# Patient Record
Sex: FEMALE | Race: WHITE | NOT HISPANIC OR LATINO
[De-identification: names, ages, dates, MRNs, and addresses within clinical notes are randomized per-mention and may not be internally consistent; named-entity substitution may affect disease eponyms.]

---

## 2017-02-11 ENCOUNTER — TRANSCRIPTION ENCOUNTER (OUTPATIENT)
Age: 42
End: 2017-02-11

## 2017-02-17 ENCOUNTER — TRANSCRIPTION ENCOUNTER (OUTPATIENT)
Age: 42
End: 2017-02-17

## 2017-02-27 ENCOUNTER — TRANSCRIPTION ENCOUNTER (OUTPATIENT)
Age: 42
End: 2017-02-27

## 2017-05-31 ENCOUNTER — TRANSCRIPTION ENCOUNTER (OUTPATIENT)
Age: 42
End: 2017-05-31

## 2018-02-14 ENCOUNTER — TRANSCRIPTION ENCOUNTER (OUTPATIENT)
Age: 43
End: 2018-02-14

## 2018-07-16 ENCOUNTER — TRANSCRIPTION ENCOUNTER (OUTPATIENT)
Age: 43
End: 2018-07-16

## 2023-09-20 ENCOUNTER — INPATIENT (INPATIENT)
Facility: HOSPITAL | Age: 48
LOS: 1 days | Discharge: HOME | DRG: 261 | End: 2023-09-22
Attending: GENERAL ACUTE CARE HOSPITAL | Admitting: STUDENT IN AN ORGANIZED HEALTH CARE EDUCATION/TRAINING PROGRAM
Payer: COMMERCIAL

## 2023-09-20 VITALS
RESPIRATION RATE: 18 BRPM | DIASTOLIC BLOOD PRESSURE: 85 MMHG | HEART RATE: 117 BPM | OXYGEN SATURATION: 96 % | TEMPERATURE: 98 F | SYSTOLIC BLOOD PRESSURE: 125 MMHG

## 2023-09-20 DIAGNOSIS — R94.31 ABNORMAL ELECTROCARDIOGRAM [ECG] [EKG]: ICD-10-CM

## 2023-09-20 DIAGNOSIS — R55 SYNCOPE AND COLLAPSE: ICD-10-CM

## 2023-09-20 DIAGNOSIS — I42.2 OTHER HYPERTROPHIC CARDIOMYOPATHY: ICD-10-CM

## 2023-09-20 LAB
ALBUMIN SERPL ELPH-MCNC: 4.5 G/DL — SIGNIFICANT CHANGE UP (ref 3.3–5.2)
ALP SERPL-CCNC: 59 U/L — SIGNIFICANT CHANGE UP (ref 40–120)
ALT FLD-CCNC: 19 U/L — SIGNIFICANT CHANGE UP
ANION GAP SERPL CALC-SCNC: 16 MMOL/L — SIGNIFICANT CHANGE UP (ref 5–17)
AST SERPL-CCNC: 18 U/L — SIGNIFICANT CHANGE UP
BASOPHILS # BLD AUTO: 0.05 K/UL — SIGNIFICANT CHANGE UP (ref 0–0.2)
BASOPHILS NFR BLD AUTO: 0.3 % — SIGNIFICANT CHANGE UP (ref 0–2)
BILIRUB SERPL-MCNC: 0.9 MG/DL — SIGNIFICANT CHANGE UP (ref 0.4–2)
BUN SERPL-MCNC: 8.6 MG/DL — SIGNIFICANT CHANGE UP (ref 8–20)
CALCIUM SERPL-MCNC: 10.2 MG/DL — SIGNIFICANT CHANGE UP (ref 8.4–10.5)
CHLORIDE SERPL-SCNC: 95 MMOL/L — LOW (ref 96–108)
CO2 SERPL-SCNC: 21 MMOL/L — LOW (ref 22–29)
CREAT SERPL-MCNC: 0.85 MG/DL — SIGNIFICANT CHANGE UP (ref 0.5–1.3)
EGFR: 83 ML/MIN/1.73M2 — SIGNIFICANT CHANGE UP
EOSINOPHIL # BLD AUTO: 0.08 K/UL — SIGNIFICANT CHANGE UP (ref 0–0.5)
EOSINOPHIL NFR BLD AUTO: 0.5 % — SIGNIFICANT CHANGE UP (ref 0–6)
ETHANOL SERPL-MCNC: <10 MG/DL — SIGNIFICANT CHANGE UP (ref 0–9)
GLUCOSE SERPL-MCNC: 143 MG/DL — HIGH (ref 70–99)
HCG SERPL-ACNC: <4 MIU/ML — SIGNIFICANT CHANGE UP
HCT VFR BLD CALC: 50.9 % — HIGH (ref 34.5–45)
HGB BLD-MCNC: 17.9 G/DL — HIGH (ref 11.5–15.5)
IMM GRANULOCYTES NFR BLD AUTO: 0.5 % — SIGNIFICANT CHANGE UP (ref 0–0.9)
LYMPHOCYTES # BLD AUTO: 1.96 K/UL — SIGNIFICANT CHANGE UP (ref 1–3.3)
LYMPHOCYTES # BLD AUTO: 13.3 % — SIGNIFICANT CHANGE UP (ref 13–44)
MCHC RBC-ENTMCNC: 32.1 PG — SIGNIFICANT CHANGE UP (ref 27–34)
MCHC RBC-ENTMCNC: 35.2 GM/DL — SIGNIFICANT CHANGE UP (ref 32–36)
MCV RBC AUTO: 91.2 FL — SIGNIFICANT CHANGE UP (ref 80–100)
MONOCYTES # BLD AUTO: 0.94 K/UL — HIGH (ref 0–0.9)
MONOCYTES NFR BLD AUTO: 6.4 % — SIGNIFICANT CHANGE UP (ref 2–14)
NEUTROPHILS # BLD AUTO: 11.61 K/UL — HIGH (ref 1.8–7.4)
NEUTROPHILS NFR BLD AUTO: 79 % — HIGH (ref 43–77)
PLATELET # BLD AUTO: 268 K/UL — SIGNIFICANT CHANGE UP (ref 150–400)
POTASSIUM SERPL-MCNC: 3.9 MMOL/L — SIGNIFICANT CHANGE UP (ref 3.5–5.3)
POTASSIUM SERPL-SCNC: 3.9 MMOL/L — SIGNIFICANT CHANGE UP (ref 3.5–5.3)
PROT SERPL-MCNC: 7.3 G/DL — SIGNIFICANT CHANGE UP (ref 6.6–8.7)
RBC # BLD: 5.58 M/UL — HIGH (ref 3.8–5.2)
RBC # FLD: 12.6 % — SIGNIFICANT CHANGE UP (ref 10.3–14.5)
SODIUM SERPL-SCNC: 132 MMOL/L — LOW (ref 135–145)
TROPONIN T SERPL-MCNC: <0.01 NG/ML — SIGNIFICANT CHANGE UP (ref 0–0.06)
TROPONIN T SERPL-MCNC: <0.01 NG/ML — SIGNIFICANT CHANGE UP (ref 0–0.06)
WBC # BLD: 14.72 K/UL — HIGH (ref 3.8–10.5)
WBC # FLD AUTO: 14.72 K/UL — HIGH (ref 3.8–10.5)

## 2023-09-20 PROCEDURE — 99285 EMERGENCY DEPT VISIT HI MDM: CPT | Mod: 25

## 2023-09-20 PROCEDURE — 72125 CT NECK SPINE W/O DYE: CPT | Mod: 26,MA

## 2023-09-20 PROCEDURE — 12001 RPR S/N/AX/GEN/TRNK 2.5CM/<: CPT | Mod: 59

## 2023-09-20 PROCEDURE — 12011 RPR F/E/E/N/L/M 2.5 CM/<: CPT

## 2023-09-20 PROCEDURE — 71045 X-RAY EXAM CHEST 1 VIEW: CPT | Mod: 26

## 2023-09-20 PROCEDURE — 70486 CT MAXILLOFACIAL W/O DYE: CPT | Mod: 26,MA

## 2023-09-20 PROCEDURE — 99223 1ST HOSP IP/OBS HIGH 75: CPT

## 2023-09-20 PROCEDURE — 93010 ELECTROCARDIOGRAM REPORT: CPT | Mod: 76

## 2023-09-20 PROCEDURE — 70450 CT HEAD/BRAIN W/O DYE: CPT | Mod: 26,MA

## 2023-09-20 PROCEDURE — 74018 RADEX ABDOMEN 1 VIEW: CPT | Mod: 26

## 2023-09-20 PROCEDURE — 93306 TTE W/DOPPLER COMPLETE: CPT | Mod: 26

## 2023-09-20 PROCEDURE — 99222 1ST HOSP IP/OBS MODERATE 55: CPT

## 2023-09-20 RX ORDER — ACETAMINOPHEN 500 MG
1000 TABLET ORAL ONCE
Refills: 0 | Status: COMPLETED | OUTPATIENT
Start: 2023-09-20 | End: 2023-09-20

## 2023-09-20 RX ORDER — INFLUENZA VIRUS VACCINE 15; 15; 15; 15 UG/.5ML; UG/.5ML; UG/.5ML; UG/.5ML
0.5 SUSPENSION INTRAMUSCULAR ONCE
Refills: 0 | Status: DISCONTINUED | OUTPATIENT
Start: 2023-09-20 | End: 2023-09-22

## 2023-09-20 RX ORDER — TETANUS TOXOID, REDUCED DIPHTHERIA TOXOID AND ACELLULAR PERTUSSIS VACCINE, ADSORBED 5; 2.5; 8; 8; 2.5 [IU]/.5ML; [IU]/.5ML; UG/.5ML; UG/.5ML; UG/.5ML
0.5 SUSPENSION INTRAMUSCULAR ONCE
Refills: 0 | Status: COMPLETED | OUTPATIENT
Start: 2023-09-20 | End: 2023-09-20

## 2023-09-20 RX ORDER — METOPROLOL TARTRATE 50 MG
50 TABLET ORAL EVERY 12 HOURS
Refills: 0 | Status: COMPLETED | OUTPATIENT
Start: 2023-09-20 | End: 2023-09-21

## 2023-09-20 RX ORDER — ALPRAZOLAM 0.25 MG
0.5 TABLET ORAL ONCE
Refills: 0 | Status: DISCONTINUED | OUTPATIENT
Start: 2023-09-20 | End: 2023-09-20

## 2023-09-20 RX ORDER — ACETAMINOPHEN 500 MG
650 TABLET ORAL EVERY 6 HOURS
Refills: 0 | Status: DISCONTINUED | OUTPATIENT
Start: 2023-09-20 | End: 2023-09-22

## 2023-09-20 RX ORDER — ACETAMINOPHEN 500 MG
1000 TABLET ORAL ONCE
Refills: 0 | Status: COMPLETED | OUTPATIENT
Start: 2023-09-20 | End: 2023-09-21

## 2023-09-20 RX ADMIN — Medication 0.5 MILLIGRAM(S): at 06:12

## 2023-09-20 RX ADMIN — Medication 650 MILLIGRAM(S): at 19:00

## 2023-09-20 RX ADMIN — Medication 0.5 MILLIGRAM(S): at 15:41

## 2023-09-20 RX ADMIN — TETANUS TOXOID, REDUCED DIPHTHERIA TOXOID AND ACELLULAR PERTUSSIS VACCINE, ADSORBED 0.5 MILLILITER(S): 5; 2.5; 8; 8; 2.5 SUSPENSION INTRAMUSCULAR at 02:36

## 2023-09-20 RX ADMIN — Medication 50 MILLIGRAM(S): at 19:00

## 2023-09-20 RX ADMIN — Medication 0.5 MILLIGRAM(S): at 02:34

## 2023-09-20 RX ADMIN — Medication 400 MILLIGRAM(S): at 10:36

## 2023-09-20 NOTE — CONSULT NOTE ADULT - NS_MD_PANP_GEN_ALL_CORE
Attending and PA/NP shared services statement (NON-critical care):
Attending and PA/NP shared services statement (NON-critical care):
no

## 2023-09-20 NOTE — H&P ADULT - NSHPLABSRESULTS_GEN_ALL_CORE
.  LABS:                         17.9   14.72 )-----------( 268      ( 20 Sep 2023 02:27 )             50.9     09-20    132<L>  |  95<L>  |  8.6  ----------------------------<  143<H>  3.9   |  21.0<L>  |  0.85    Ca    10.2      20 Sep 2023 02:27    TPro  7.3  /  Alb  4.5  /  TBili  0.9  /  DBili  x   /  AST  18  /  ALT  19  /  AlkPhos  59  09-20      Urinalysis Basic - ( 20 Sep 2023 02:27 )    Color: x / Appearance: x / SG: x / pH: x  Gluc: 143 mg/dL / Ketone: x  / Bili: x / Urobili: x   Blood: x / Protein: x / Nitrite: x   Leuk Esterase: x / RBC: x / WBC x   Sq Epi: x / Non Sq Epi: x / Bacteria: x            RADIOLOGY, EKG & ADDITIONAL TESTS: Reviewed.

## 2023-09-20 NOTE — PATIENT PROFILE ADULT - DEAF OR HARD OF HEARING?
Problem: Safety - Adult  Goal: Free from fall injury  7/21/2023 1910 by Toya Baig RN  Outcome: Progressing  Note: Pt in bed with bed alarm on, instructed to call for assistance. Verbalized understanding. All fall precautions in place. Problem: ABCDS Injury Assessment  Goal: Absence of physical injury  7/21/2023 1910 by Toya Baig RN  Outcome: Progressing  Note: Patient walked the halls multiple times today. Patient also did the bike in the family room for about 10 mins. Patient tolerated well. Patient declined nausea or pain during shift. Patient ate mor alysa 75 percent for all meals. Patient did appeared agitated in the morning but it had to do with the breakfast order being wrong. Over the course of shift patient pleasant. Spoke with Glory Aguirre NP in regards to patient's chemo medication that the pharmacy didn't ship. Patient's wife given update that it will be delivered Monday. no

## 2023-09-20 NOTE — ED CDU PROVIDER DISPOSITION NOTE - CLINICAL COURSE
49 yo female no reported past medical recent sinus infection on augmentin reported dizziness with questionable syncopal episode, ct without acute bleed or fracture, multiple lacerations repaired by initial ED team, found to have t wave inversions lateral lead with cards consult placed with recs for TTE, found to have apical hypertrophy on TTE with recs for admit for cardiac MRI

## 2023-09-20 NOTE — ED PROVIDER NOTE - CARE PLAN
1 Principal Discharge DX:	Abnormal EKG  Secondary Diagnosis:	Dizziness  Secondary Diagnosis:	Fall  Secondary Diagnosis:	Laceration of face  Secondary Diagnosis:	Laceration of knee, left  Secondary Diagnosis:	Tooth missing

## 2023-09-20 NOTE — ED PROVIDER NOTE - OBJECTIVE STATEMENT
49 yo female no PMHx presents to ED c/o fall. Patient has been dizzy x1 week, diagnosed with sinus infection. Presently on Augmentin. Tonight got up from couch, became dizzy, hit door handle with face. Sustained laceration to lip and knee as well as missing teeth. Unwitnessed. Tetanus unknown. No further complaints at this time.   Denies LOC, chest pain, SOB. 49 yo female no PMHx presents to ED c/o fall. Patient has been dizzy x1 week, diagnosed with sinus infection. States normally becomes dizzy with sinus infections. Presently on Augmentin; has another four days left. Tonight got up from couch, became dizzy, hit door handle with face. Sustained laceration to lip and knee as well as missing teeth. Unwitnessed. Tetanus unknown. No further complaints at this time.   Denies LOC, chest pain, SOB. 47 yo female no PMHx presents to ED c/o fall. Patient has been dizzy x1 week, diagnosed with sinus infection. States normally becomes dizzy with sinus infections. Presently on Augmentin; has another four days left. Tonight got up from couch, became dizzy, hit door handle with face. No preceding sxms. Sustained laceration to lip and knee as well as missing teeth. Unwitnessed. Tetanus unknown. No further complaints at this time.   Denies LOC, chest pain, SOB.

## 2023-09-20 NOTE — ED ADULT NURSE NOTE - CHIEF COMPLAINT QUOTE
patient status post fall,reports feeling dizzy precipitating her fall,patient Alert and oriented to person, place, and time, denied loss of consciousness, bleeding to lip, patient anxious and upset on arrival, front left tooth missing, bleeding controlled, denied alcohol use

## 2023-09-20 NOTE — H&P ADULT - NSICDXFAMILYHX_GEN_ALL_CORE_FT
FAMILY HISTORY:  No pertinent family history in first degree relatives     FAMILY HISTORY:  Grandparent  Still living? Unknown  FH: lung cancer, Age at diagnosis: Age Unknown

## 2023-09-20 NOTE — ED PROVIDER NOTE - NSFOLLOWUPINSTRUCTIONS_ED_ALL_ED_FT
- Prescription sent to pharmacy.    - Additional four days of Augmentin sent.   - Ibuprofen 600mg every 6 hours as needed for pain.  - Acetaminophen 650mg every 6 hours as needed for pain.   - Apply Bacitracin as needed.  - Keep out of sun.  - No not submerge in water (i.e. ocean, pool).   - Please call today to schedule follow up appointment with dental clinic.   - Please follow up with your primary care physician in 24-48 hours.   - Please seek immediate medical attention for any new/worsening, signs/symptoms or concerns including but not limited to severe pain/swelling/surrounding redness, or drainage from wound.    Suture Removal Instructions  - Lip: dissolvable sutures  - Chin: 5-6 days  - Knee: 10-14 days      Feel better.      Sutured Wound Care      Sutures are stitches that can be used to close wounds. Taking care of your wound properly can help to prevent pain and infection. It can also help your wound to heal more quickly. Follow instructions from your health care provider about how to care for your sutured wound.      Supplies needed:    •Soap and water.      •A clean bandage (dressing), if needed.      •Antibiotic ointment.      •A clean towel.        How to care for your sutured wound     •Keep the wound completely dry for the first 24 hours, or for as long as directed by your health care provider. After 24–48 hours, you may shower or bathe as directed by your health care provider. Do not soak or submerge the wound in water until the sutures have been removed.    •After the first 24 hours, clean the wound once a day, or as often as directed by your health care provider, using the following steps:  •Wash the wound with soap and water.      •Rinse the wound with water to remove all soap.      •Pat the wound dry with a clean towel. Do not rub the wound.        •After cleaning the wound, apply a thin layer of antibiotic ointment as directed by your health care provider. This will prevent infection and keep the dressing from sticking to the wound.    •Follow instructions from your health care provider about how to change your dressing:  •Wash your hands with soap and water. If soap and water are not available, use hand .      •Change your dressing at least once a day, or as often as told by your health care provider. If your dressing gets wet or dirty, change it.      •Leave sutures and other skin closures, such as adhesive tape or skin glue, in place. These skin closures may need to stay in place for 2 weeks or longer. If adhesive strip edges start to loosen and curl up, you may trim the loose edges. Do not remove adhesive strips completely unless your health care provider tells you to do that.      •Check your wound every day for signs of infection. Watch for:  •Redness, swelling, or pain.      •Fluid or blood.      •Warmth.      •Pus or a bad smell.        •Have the sutures removed as directed by your health care provider.        Follow these instructions at home:    Medicines     •Take or apply over-the-counter and prescription medicines only as told by your health care provider.      •If you were prescribed an antibiotic medicine or ointment, take or apply it as told by your health care provider. Do not stop using the antibiotic even if your condition improves.      General instructions     •To help reduce scarring after your wound heals, cover your wound with clothing or apply sunscreen of at least 30 SPF whenever you are outside.      • Do not scratch or pick at your wound.      •Avoid stretching your wound.      •Raise (elevate) the injured area above the level of your heart while you are sitting or lying down, if possible.      •Drink enough fluids to keep your urine clear or pale yellow.      •Keep all follow-up visits as told by your health care provider. This is important.        Contact a health care provider if:    •You received a tetanus shot and you have swelling, severe pain, redness, or bleeding at the injection site.      •Your wound breaks open.      •You have redness, swelling, or pain around your wound.      •You have fluid or blood coming from your wound.      •Your wound feels warm to the touch.      •You have a fever.      •You notice something coming out of your wound, such as wood or glass.      •You have pain that does not get better with medicine.      •The skin near your wound changes color.      •You need to change your dressing very frequently due to a lot of fluid, blood, or pus draining from the wound.      •You develop a new rash.      •You develop numbness around the wound.        Get help right away if:    •You develop severe swelling around your wound.      •You have pus or a bad smell coming from your wound.      •Your pain suddenly gets worse and is severe.      •You develop painful lumps near your wound or anywhere on your body.      •You have a red streak going away from your wound.    •The wound is on your hand or foot and:  •You cannot properly move a finger or toe.      •Your fingers or toes look pale or bluish.      •You have numbness that is spreading down your hand, foot, fingers, or toes.          Summary    •Sutures are stitches that can be used to close wounds.      •Taking care of your wound properly can help to prevent pain and infection.      •Keep the wound completely dry for the first 24 hours, or for as long as directed by your health care provider. After 24–48 hours, you may shower or bathe as directed by your health care provider.      This information is not intended to replace advice given to you by your health care provider. Make sure you discuss any questions you have with your health care provider.

## 2023-09-20 NOTE — H&P ADULT - NSHPPHYSICALEXAM_GEN_ALL_CORE
CONSTITUTIONAL: Awake, alert and in no apparent distress  ENMT: Airway patent, Nasal mucosa clear. Mouth with normal mucosa.   EYES: Clear bilaterally, pupils equal, round and reactive to light. EOMI.  CARDIAC: Normal rate, regular rhythm.  Heart sounds S1, S2.  No murmurs, rubs or gallops   RESPIRATORY: Breath sounds clear and equal bilaterally. No wheezes, rhales or rhonchi   ABDOMINAL: Nontender to palpation. No rebound/ guarding   MUSCULOSKELETAL: Spine appears normal, range of motion is not limited, no muscle or joint tenderness   EXTREMITIES: No edema, cyanosis or deformity   NEUROLOGICAL: Alert and oriented, no focal deficits, no motor or sensory deficits   SKIN: No rash, skin turgor CONSTITUTIONAL: Awake, alert and in no apparent distress  ENMT: Lip laceration with dry blood, several teeth missing   CARDIAC: Normal rate, regular rhythm.  Heart sounds S1, S2.    RESPIRATORY: Breath sounds clear and equal bilaterally.   ABDOMINAL: Nontender to palpation. No rebound/ guarding   EXTREMITIES: L knee with laceration   NEUROLOGICAL: Alert and oriented, no focal deficits, no motor or sensory deficits

## 2023-09-20 NOTE — CONSULT NOTE ADULT - SUBJECTIVE AND OBJECTIVE BOX
[facility]                                              CARDIOLOGY AT [place] [address]                                             Telephone: [phone]. Fax:[phone]                                                       CARDIOLOGY CONSULTATION NOTE                                                                                             History obtained by: Patient and medical record  Community Cardiologist: None   obtained: Yes [  ] No [ x ]  Reason for Consultation: Abnormal EKG  Available out pt records reviewed: Yes [ x ] No [  ]    Chief complaint:    Patient is a 48y old  Female who presents with a chief complaint of fall.    HPI: Patient is a 49 y/o F active smoker with no pertinent PMHx who presented to the ED after a unwitnessed fall. Patient states about a week ago she was diagnosed with a sinus infection and started on Augmentin. Patient states that she has been experiencing dizziness for the last week with the sinus infection. Patient states that yesterday evening she was walking across her living room, felt dizzy, thinks she might have lost consciousness briefly, and fell forward hitting her face into a door handle. Patient sustained a laceration to the lip, knee, and is missing teeth. Patient states that she has had syncopal events before, but is currently lethargic and cannot recall the details. Patient denies any fevers, chills, CP, SOB, abdominal pain, N/V/D, headache.      PAST MEDICAL HISTORY  No pertinent past medical history        PAST SURGICAL HISTORY  No significant past surgical history        SOCIAL HISTORY:    CIGARETTES:   Active smoker  ALCOHOL: Denies  DRUGS: Denies    FAMILY HISTORY:  No pertinent family history in first degree relatives  Uncle with aneurysm.    Family History of Cardiovascular Disease:  Yes [  ] No [  ]  Coronary Artery Disease in first degree relative: Yes [  ] No [  ]  Sudden Cardiac Death in First degree relative: Yes [  ] No [  ]    HOME MEDICATIONS:      CURRENT CARDIAC MEDICATIONS:      CURRENT OTHER MEDICATIONS:  acetaminophen   IVPB .. 1000 milliGRAM(s) IV Intermittent once, Stop order after: 1 Doses      ALLERGIES:   sulfonamides (Rash)      REVIEW OF SYMPTOMS:   CONSTITUTIONAL: No fever, no chills, no weight loss, no weight gain, no fatigue   ENMT:  AS PER HPI  NECK: No pain or stiffness  CARDIOVASCULAR: AS PER HPI  RESPIRATORY: no Shortness of breath, no cough, no wheezing  : No dysuria, no hematuria   GI: No dark color stool, no nausea, no diarrhea, no constipation, no abdominal pain   NEURO: AS PER HPI  MUSCULOSKELETAL: No joint pain or swelling; No muscle, back, or extremity pain  PSYCH: No agitation, no anxiety.    ALL OTHER REVIEW OF SYSTEMS ARE NEGATIVE.    VITAL SIGNS:  T(C): 36.9 (09-20-23 @ 08:01), Max: 36.9 (09-20-23 @ 08:01)  T(F): 98.4 (09-20-23 @ 08:01), Max: 98.4 (09-20-23 @ 08:01)  HR: 90 (09-20-23 @ 08:01) (90 - 117)  BP: 113/78 (09-20-23 @ 08:01) (113/78 - 125/85)  RR: 18 (09-20-23 @ 08:01) (18 - 18)  SpO2: 99% (09-20-23 @ 08:01) (96% - 99%)    INTAKE AND OUTPUT:       PHYSICAL EXAM:  Constitutional: Comfortable . No acute distress.   HEENT: Atraumatic and normocephalic , neck is supple . no JVD. No carotid bruit. Dried blood around mouth, lip laceration  CNS: A&Ox3. No focal deficits.   Respiratory: CTAB, unlabored   Cardiovascular: RRR normal s1 s2. No murmur. No rubs or gallop.  Gastrointestinal: Soft, non-tender. +Bowel sounds.   Extremities: 2+ Peripheral Pulses, No clubbing, cyanosis, or edema  Psychiatric: Calm . no agitation.   Skin: Warm and dry, no ulcers on extremities     LABS:  ( 20 Sep 2023 02:27 )  Troponin T  <0.01,  CPK  X    , CKMB  X    , BNP X                                  17.9   14.72 )-----------( 268      ( 20 Sep 2023 02:27 )             50.9     09-20    132<L>  |  95<L>  |  8.6  ----------------------------<  143<H>  3.9   |  21.0<L>  |  0.85    Ca    10.2      20 Sep 2023 02:27    TPro  7.3  /  Alb  4.5  /  TBili  0.9  /  DBili  x   /  AST  18  /  ALT  19  /  AlkPhos  59  09-20      Urinalysis Basic - ( 20 Sep 2023 02:27 )    Color: x / Appearance: x / SG: x / pH: x  Gluc: 143 mg/dL / Ketone: x  / Bili: x / Urobili: x   Blood: x / Protein: x / Nitrite: x   Leuk Esterase: x / RBC: x / WBC x   Sq Epi: x / Non Sq Epi: x / Bacteria: x              INTERPRETATION OF TELEMETRY:     ECG: SR with short VA interval, ST depressions with T wave inversions anterolaterally   Prior ECG: Yes [  ] No [  ]    RADIOLOGY & ADDITIONAL STUDIES:    X-ray:      CT scan:     < from: CT Head No Cont (09.20.23 @ 06:56) >  IMPRESSION:    CT HEAD: No acute intracranial hemorrhage, mass effect, or osseous   fracture.    CT MAXILLOFACIAL BONES: Swelling and skin thickening overlying the   mandible with multiple foci of air consistent with laceration. There are   punctate foci of high density which may represent foreign bodies. The   right and left maxillary central incisors and left maxillary central   incisor are missing. Correlate clinically.    No acute maxillofacial bone fracture.      CT CERVICAL SPINE: No acute cervical spine fracture or traumatic   malalignment.      < end of copied text >    MRI:   US:

## 2023-09-20 NOTE — ED CDU PROVIDER INITIAL DAY NOTE - CLINICAL SUMMARY MEDICAL DECISION MAKING FREE TEXT BOX
49 yo female no PMHx presents to ED c/o fall. Patient with sinus infection which caused dizziness per patient, subsequently falling. Multiple teeth now missing, multiple lacerations repaired. CTs negative. Patient with abnormal EKG. Agreeable to OBS for cardiology consult.

## 2023-09-20 NOTE — ED ADULT TRIAGE NOTE - GLASGOW COMA SCALE: BEST VERBAL RESPONSE, MLM
Preventing Skin Cancer  Relaxing in the sun may feel good. But it isnt good for your skin. In fact, being exposed to the suns harmful rays is a major cause of skin cancer. This is a serious disease that can be life-threatening. People of all ages and backgrounds are at risk. But in most cases, skin cancer can be prevented.    Your Role in Prevention  You can act today to help prevent skin cancer. Start by avoiding the suns UV (ultraviolet) rays. And dont use tanning beds, which are no safer than the sun. Taking these steps can help keep you from getting skin cancer. It can also help prevent wrinkles and other sun-induced aging effects. Make sure your children also follow these safeguards. Now is the time to start taking preventive steps against skin cancer.  When You Are Outdoors  Protect your skin when you go outdoors during the day. Take precautions whenever you go out to eat, run errands by car or on foot, or do any outdoor activity. There isnt just one easy way to protect your skin. Its best to follow all of these steps:  · Wear tightly woven clothing that covers your skin. Put on a wide-brimmed hat to protect your face, ears, and scalp.  · Watch the clock. Try to avoid the sun between 10 a.m. and 4 p.m., when it is strongest.  · Head for the shade or create your own. Use an umbrella when sitting or strolling.  · Know that the suns rays can reflect off sand, water, and snow. This can harm your skin. Take extra care when you are near reflective surfaces.  · Keep in mind that even when the weather is hazy or cloudy, your skin can be exposed to strong UV rays.  · Shield your skin with sunscreen. Also, apply sunscreen to your childrens skin.  Tips for Using Sunscreen  To help prevent skin cancer, choose the right sunscreen and use it correctly. Try the following tips:  · Choose a sunscreen that has a sun protection factor (SPF) of at least 15. For the best protection, an SPF of at least 30 is preferred.  Also, choose a sunscreen labeled broad spectrum. This will shield you from both UVA and UVB (ultraviolet A and B) rays.  · If one brand irritates your skin, try another, particularly ones without fragrance.  · Use a water-resistant sunscreen if swimming or sweating.  · Reapply sunscreen every 2 hours. If youre active, do this more often.  · Cover any sun-exposed skin, from your face to your feet. Dont forget your ears and your lips.  · Know that while sunscreen helps protect you, it isnt enough. You should also wear protective clothing. And try to stay out of the sun as much as you can, especially from 10 a.m. to 4 p.m.  © 9903-1932 [employer]. [address] [place] [address], [place] [address]. All rights reserved. This information is not intended as a substitute for professional medical care. Always follow your healthcare professional's instructions.         (V5) oriented

## 2023-09-20 NOTE — CONSULT NOTE ADULT - PROBLEM SELECTOR RECOMMENDATION 9
- Patient states she thinks she might have lost consciousness just prior to the fall.   - EKG with short TX and ST/T wave changes.   - No chest pain or dyspnea.   - Check orthostatics.   - Check D-Dimer.   - Obtain TTE.   - If echo with no acute findings, then would recommend outpatient MCOT monitoring.

## 2023-09-20 NOTE — ED ADULT TRIAGE NOTE - CHIEF COMPLAINT QUOTE
patient status post fall,reports feeling dizzy precipitating her fall,patient Alert and oriented to person, place, and time, denied loss of consciousness, bleeding to lip, patient anxious and upset on arrival, front left tooth missing, bleeding controlled patient status post fall,reports feeling dizzy precipitating her fall,patient Alert and oriented to person, place, and time, denied loss of consciousness, bleeding to lip, patient anxious and upset on arrival, front left tooth missing, bleeding controlled, denied alcohol use

## 2023-09-20 NOTE — H&P ADULT - HISTORY OF PRESENT ILLNESS
47 yo female with no known PMH presents to the ER after a fall. Pt says that she has been feeling unwell for the past week and was diagnosed with a sinus infection for which she has been taking antibiotics. Last night she felt dizzy and lost consciousness and fell forward hitting her face on the door handle. She then started bleeding from her mouth and came in to the ER. Has had similar episodes in the past. No chest pain, SOB, headahce prior to event.    49 yo female with no known PMH presents to the ER after a fall. Pt says that she has been feeling unwell for the past week and was diagnosed with a sinus infection for which she has been taking antibiotics. Last night she felt dizzy and lost consciousness and fell forward hitting her face on the door handle and floor. She then started bleeding from her mouth and came in to the ER. Has had similar episodes in the past. No chest pain, SOB, headahce prior to event.

## 2023-09-20 NOTE — PATIENT PROFILE ADULT - FUNCTIONAL ASSESSMENT - BASIC MOBILITY 6.
4-calculated by average/Not able to assess (calculate score using [facility] averaging method)

## 2023-09-20 NOTE — CONSULT NOTE ADULT - NS ATTEND AMEND GEN_ALL_CORE FT
seen with above,    48M no significant past medical history with recent sinus infection on antibiotic with intermittent dizziness, had an episode of traumatic fall with questionable syncope at home with lip laceration and lost of teeth presented to the ER, EKG with diffuse ST depression and short GA interval, she does not recollect any prior EKG or with her PCP during annual physical, also remember an episode of syncope last year did not get any cardiac evaluation. Denies family history of sudden cardiac death, has siblings and 2 adults children who are healthy   -TTE done with apical hypertrophy without obstructive gradient, given clinical presentation and Echo findings recommend cardiac MRI to exclude apical HCM and to assess for scar burden.   -need admission for further workup, repeat EKG, continue telemetry to exclude arrhythmia and EP eval., consider ILR vs. outpatient MCOT?        Nasim Johnson DO, [facility]  Faculty Non-Invasive Cardiologist  [phone]
.  .  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    Her presenting syncopal episode is suggestive of orthostasis as it occurred shortly after getting up int he setting a URI. However, her prior episodes occur suddenly and with stress. She has no recollection of events and feels normal when she wakes up. Given the presence of apical HCM, I believe she is at high risk of sudden cardiac arrest as these sudden syncopal episodes may have been self-aborted ventricular arrhythmias. I agree with obtaining a cardiac MRI to get a better understanding of her cardiomyopathy. Given high risk of recurrent events, she would benefit with secondary prevention ICD.    I reviewed all risks, benefits and alternatives of secondary prevention ICD implantation. I also reviewed all aspects of ICD therapy and the importance of maintaining close follow up. After our discussion, she agreed to proceed with ICD implantation.    Although S-ICD would be good given her young age, her history of apical hypertrophy may result in inappropriate shocks. Therefore, transvenous device may be better.    Lastly, she should get genetic testing and if positive, should precipitate cascade screening for all related family members. Otherwise, first degree relatives should get screening echocardiograms.    Recommendations:  - Cardiac MRI  - Secondary prevention ICD  - Screening of first degree relatives    Thank you for this consult. We will follow with you.    Los Iraheta MD  Clinical Cardiac Electrophysiology

## 2023-09-20 NOTE — ED PROVIDER NOTE - IV ALTEPLASE EXCL ABS HIDDEN
show [No Pertinent Cardiac History] : no history of aortic stenosis, atrial fibrillation, coronary artery disease, recent myocardial infarction, or implantable device/pacemaker [No Pertinent Pulmonary History] : no history of asthma, COPD, sleep apnea, or smoking [No Adverse Anesthesia Reaction] : no adverse anesthesia reaction in self or family member [(Patient denies any chest pain, claudication, dyspnea on exertion, orthopnea, palpitations or syncope)] : Patient denies any chest pain, claudication, dyspnea on exertion, orthopnea, palpitations or syncope [Moderate (4-6 METs)] : Moderate (4-6 METs) [Chronic Anticoagulation] : no chronic anticoagulation [Chronic Kidney Disease] : no chronic kidney disease [Diabetes] : no diabetes [FreeTextEntry1] : anterior/ posterior enterocoele repair, cystoscopy [FreeTextEntry2] : 01/16/2019 [FreeTextEntry3] : Dr Yon Phillips [FreeTextEntry4] : Pt here with her daughter for pre op clearance for the above procedure. She has no known CAD. No complaints of chest pain or SOB currently. No fever. She is able to climb a flight of stairs without difficulty. Pt is usually able to walk around and do routine house work without issue.

## 2023-09-20 NOTE — CONSULT NOTE ADULT - SUBJECTIVE AND OBJECTIVE BOX
[place] [facility]  [facility]   Office: [address] [address]  Telephone: [phone] Fax:[phone]      HPI:  Pt is a 47 y/o f, current smoker, with no known PMHx, who presented to [facility] earlier today c/o sudden syncope resulting in facial trauma. Pt states she has been suffering from a sinus infection for the past 10 days and was recently prescribed a course of Augmentin. Pt states she stood up to  use the bathroom and after taking several steps she lost consciousness and struck her face on the floor. Pt reports similar episodes of syncope in the past (approximately 15-20 episodes) usually preceded by "stress". Pt reports feeling palpitations prior to the syncopal events and has attributed them to anxiety and thus never underwent a cardiac workup. TTE performed today revealed left ventricular hypertrophy concerning for HCM, for which the EP service has been consulted.     Pt reports her maternal grandfather  of a heart attack at age 35, no other known SCD in first degree relatives. EKG review reveals sinus rhythm with intact conduction, short NJ interval, with diffuse ST depressions with LV strain pattern. No telemetry available for review as pt has not been on cardiac monitor. Pt currently denies chest pain, palpitations, dizziness, fever, chills.        PAST MEDICAL & SURGICAL HISTORY:  No pertinent past medical history      No significant past surgical history          REVIEW OF SYSTEMS:    CONSTITUTIONAL: No fever / chills  EYES: No visual disturbances  NECK: No pain or stiffness  RESPIRATORY: No cough, wheezing, chills or hemoptysis; No shortness of breath  CARDIOVASCULAR: see HPI  GASTROINTESTINAL: No abdominal or epigastric pain. No nausea, vomiting, or hematemesis; No diarrhea or constipation. No melena or hematochezia.  NEUROLOGICAL: + LOC  SKIN: + facial laceration.         MEDICATIONS  (STANDING):    MEDICATIONS  (PRN):  acetaminophen     Tablet .. 650 milliGRAM(s) Oral every 6 hours PRN Temp greater or equal to 38C (100.4F), Mild Pain (1 - 3)      Allergies    sulfonamides (Rash)    Intolerances        SOCIAL HISTORY:  Occasional ETOH  + smoker x 15 years   Denies illicit drug use      FAMILY HISTORY:  Maternal grandfather  of hear attack at age 35        Vital Signs Last 24 Hrs  T(C): 36.9 (20 Sep 2023 08:01), Max: 36.9 (20 Sep 2023 08:01)  T(F): 98.4 (20 Sep 2023 08:01), Max: 98.4 (20 Sep 2023 08:01)  HR: 90 (20 Sep 2023 08:01) (90 - 117)  BP: 113/78 (20 Sep 2023 08:01) (113/78 - 125/85)  BP(mean): --  RR: 18 (20 Sep 2023 08:) (18 - 18)  SpO2: 99% (20 Sep 2023 08:) (96% - 99%)    Parameters below as of 20 Sep 2023 08:  Patient On (Oxygen Delivery Method): room air        Physical Exam:    Constitutional: NAD  Head: + facial laceration, missing dentition  Eyes:  extraocular movements intact, sclera anicteric  Neck: supple, full range of motion, nontender to palpation midline  Chest wall: normal in appearance, nontender to palpation  Resp: effort normal, breath sounds clear to auscultation bilaterally  Cardiac: Heart regular rate and rhythm, no murmurs, rubs, or gallops.  No edema.  Abdomen: soft, nondistended, bowel sounds active, nontender to palpation in all four quadrants    Musculoskeletal: full range of motion all extremities   Neuro: Alert and oriented x 3,  Psych: mood calm     LABS:                        17.9   14.72 )-----------( 268      ( 20 Sep 2023 02:27 )             50.9   09-20    132<L>  |  95<L>  |  8.6  ----------------------------<  143<H>  3.9   |  21.0<L>  |  0.85    Ca    10.2      20 Sep 2023 02:27    TPro  7.3  /  Alb  4.5  /  TBili  0.9  /  DBili  x   /  AST  18  /  ALT  19  /  AlkPhos  59  09-20  LIVER FUNCTIONS - ( 20 Sep 2023 02:27 )  Alb: 4.5 g/dL / Pro: 7.3 g/dL / ALK PHOS: 59 U/L / ALT: 19 U/L / AST: 18 U/L / GGT: x           CARDIAC MARKERS ( 20 Sep 2023 09:43 )  x     / <0.01 ng/mL / x     / x     / x      CARDIAC MARKERS ( 20 Sep 2023 02:27 )  x     / <0.01 ng/mL / x     / x     / x          Urinalysis Basic - ( 20 Sep 2023 02:27 )    Color: x / Appearance: x / SG: x / pH: x  Gluc: 143 mg/dL / Ketone: x  / Bili: x / Urobili: x   Blood: x / Protein: x / Nitrite: x   Leuk Esterase: x / RBC: x / WBC x   Sq Epi: x / Non Sq Epi: x / Bacteria: x        RADIOLOGY & ADDITIONAL STUDIES:      < from: TTE Echo Complete w/ Contrast w/ Doppler (23 @ 08:25) >  Summary:   1. Left ventricular ejection fraction, by visual estimation, is 60 to   65%.   2. Normal global left ventricular systolic function.   3. There is apical left ventricular hypertrophy in spade-like pattern,   recommend cardiac MRI to confirm apical HCM. No evidence of apical   aneurysm or thrombus with intravenous echocontrast.   4. Global longitudinal strain values reduced at the LV apex.   5. Normal right ventricular size and function, inadequate estimation of   RVSP.   6. There is no evidence of pericardial effusion.          Pt is a 47 y/o f, current smoker, with no known PMHx, who presented to [facility] earlier today c/o sudden syncope resulting in facial trauma. Pt states she has been suffering from a sinus infection for the past 10 days and was recently prescribed a course of Augmentin. Pt states she stood up to  use the bathroom and after taking several steps she lost consciousness and struck her face on the floor. Pt reports similar episodes of syncope in the past (approximately 15-20 episodes) usually preceded by "stress". Pt reports feeling palpitations prior to the syncopal events and has attributed them to anxiety and thus never underwent a cardiac workup. TTE performed today revealed left ventricular hypertrophy concerning for HCM, for which the EP service has been consulted.     Pt reports her maternal grandfather  of a heart attack at age 35, no other known SCD in first degree relatives. EKG review reveals sinus rhythm with intact conduction, short NJ interval, with diffuse ST depressions with LV strain pattern. No telemetry available for review as pt has not been on cardiac monitor. Pt currently denies chest pain, palpitations, dizziness, fever, chills.        Incomplete note   [place] [facility]  [facility]   Office: [address] [address]  Telephone: [phone] Fax:[phone]      HPI:  Pt is a 47 y/o f, current smoker, with no known PMHx, who presented to [facility] earlier today c/o sudden syncope resulting in facial trauma. Pt states she has been suffering from a sinus infection for the past 10 days and was recently prescribed a course of Augmentin. Pt states she stood up to  use the bathroom and after taking several steps she lost consciousness and struck her face on the floor. Pt reports similar episodes of syncope in the past (approximately 15-20 episodes) usually preceded by "stress". Pt reports feeling palpitations prior to the syncopal events and has attributed them to anxiety and thus never underwent a cardiac workup. TTE performed today revealed left ventricular hypertrophy concerning for HCM, for which the EP service has been consulted.     Pt reports her maternal grandfather  of a heart attack at age 35, no other known SCD in first degree relatives. EKG review reveals sinus rhythm with intact conduction, short FL interval, with diffuse ST depressions. No telemetry available for review as pt has not been on cardiac monitor. Pt currently denies chest pain, palpitations, dizziness, fever, chills.        PAST MEDICAL & SURGICAL HISTORY:  No pertinent past medical history      No significant past surgical history          REVIEW OF SYSTEMS:    CONSTITUTIONAL: No fever / chills  EYES: No visual disturbances  NECK: No pain or stiffness  RESPIRATORY: No cough, wheezing, chills or hemoptysis; No shortness of breath  CARDIOVASCULAR: see HPI  GASTROINTESTINAL: No abdominal or epigastric pain. No nausea, vomiting, or hematemesis; No diarrhea or constipation. No melena or hematochezia.  NEUROLOGICAL: + LOC  SKIN: + facial laceration.         MEDICATIONS  (STANDING):    MEDICATIONS  (PRN):  acetaminophen     Tablet .. 650 milliGRAM(s) Oral every 6 hours PRN Temp greater or equal to 38C (100.4F), Mild Pain (1 - 3)      Allergies    sulfonamides (Rash)    Intolerances        SOCIAL HISTORY:  Occasional ETOH  + smoker x 15 years   Denies illicit drug use      FAMILY HISTORY:  Maternal grandfather  of hear attack at age 35        Vital Signs Last 24 Hrs  T(C): 36.9 (20 Sep 2023 08:01), Max: 36.9 (20 Sep 2023 08:01)  T(F): 98.4 (20 Sep 2023 08:01), Max: 98.4 (20 Sep 2023 08:01)  HR: 90 (20 Sep 2023 08:01) (90 - 117)  BP: 113/78 (20 Sep 2023 08:01) (113/78 - 125/85)  BP(mean): --  RR: 18 (20 Sep 2023 08:) (18 - 18)  SpO2: 99% (20 Sep 2023 08:) (96% - 99%)    Parameters below as of 20 Sep 2023 08:01  Patient On (Oxygen Delivery Method): room air        Physical Exam:    Constitutional: NAD  Head: + facial laceration, missing dentition  Eyes:  extraocular movements intact, sclera anicteric  Neck: supple, full range of motion, nontender to palpation midline  Chest wall: normal in appearance, nontender to palpation  Resp: effort normal, breath sounds clear to auscultation bilaterally  Cardiac: Heart regular rate and rhythm, no murmurs, rubs, or gallops.  No edema.  Abdomen: soft, nondistended, bowel sounds active, nontender to palpation in all four quadrants    Musculoskeletal: full range of motion all extremities   Neuro: Alert and oriented x 3,  Psych: mood calm     LABS:                        17.9   14.72 )-----------( 268      ( 20 Sep 2023 02:27 )             50.9   09-20    132<L>  |  95<L>  |  8.6  ----------------------------<  143<H>  3.9   |  21.0<L>  |  0.85    Ca    10.2      20 Sep 2023 02:27    TPro  7.3  /  Alb  4.5  /  TBili  0.9  /  DBili  x   /  AST  18  /  ALT  19  /  AlkPhos  59  09-20  LIVER FUNCTIONS - ( 20 Sep 2023 02:27 )  Alb: 4.5 g/dL / Pro: 7.3 g/dL / ALK PHOS: 59 U/L / ALT: 19 U/L / AST: 18 U/L / GGT: x           CARDIAC MARKERS ( 20 Sep 2023 09:43 )  x     / <0.01 ng/mL / x     / x     / x      CARDIAC MARKERS ( 20 Sep 2023 02:27 )  x     / <0.01 ng/mL / x     / x     / x          Urinalysis Basic - ( 20 Sep 2023 02:27 )    Color: x / Appearance: x / SG: x / pH: x  Gluc: 143 mg/dL / Ketone: x  / Bili: x / Urobili: x   Blood: x / Protein: x / Nitrite: x   Leuk Esterase: x / RBC: x / WBC x   Sq Epi: x / Non Sq Epi: x / Bacteria: x        RADIOLOGY & ADDITIONAL STUDIES:      < from: TTE Echo Complete w/ Contrast w/ Doppler (23 @ 08:25) >  Summary:   1. Left ventricular ejection fraction, by visual estimation, is 60 to   65%.   2. Normal global left ventricular systolic function.   3. There is apical left ventricular hypertrophy in spade-like pattern,   recommend cardiac MRI to confirm apical HCM. No evidence of apical   aneurysm or thrombus with intravenous echocontrast.   4. Global longitudinal strain values reduced at the LV apex.   5. Normal right ventricular size and function, inadequate estimation of   RVSP.   6. There is no evidence of pericardial effusion.      Assessment:    47 y/o f, current smoker, with no known PMHx, who presented to [facility] earlier today c/o sudden syncope resulting in facial trauma. Pt states she has been suffering from a sinus infection for the past 10 days and was recently prescribed a course of Augmentin. Pt states she stood up to  use the bathroom and after taking several steps she lost consciousness and struck her face on the floor. Pt reports similar episodes of syncope in the past (approximately 15-20 episodes) usually preceded by "stress". Pt reports feeling palpitations prior to the syncopal events and has attributed them to anxiety and thus never underwent a cardiac workup. TTE performed today revealed left ventricular hypertrophy concerning for HCM, for which the EP service has been consulted.     Pt reports her maternal grandfather  of a heart attack at age 35, no other known SCD in first degree relatives. EKG review reveals sinus rhythm with intact conduction, short FL interval, with diffuse ST depressions. No telemetry available for review as pt has not been on cardiac monitor. Pt currently denies chest pain, palpitations, dizziness, fever, chills.      Plan:  1) Recurrent syncope in the setting of apical HCM  - Pt with multiple syncopal episodes concerning for cardiac arrest  - Maintain telemetry monitoring throughout admission  - Defibrillator pads at bedside  - Cardiac MRI to assess for LGE / structural abnormalities  - ICD implant for secondary prevention prior to discharge  - NPO after midnight for possible ICD intervention tomorrow if MRI is completed    Discussed with Dr. Iraheta, further recommendations to follow

## 2023-09-20 NOTE — CONSULT NOTE ADULT - ASSESSMENT
A/P: Patient is a 47 y/o F active smoker with no pertinent PMHx who presented to the ED after a unwitnessed fall. Patient states about a week ago she was diagnosed with a sinus infection and started on Augmentin. Patient states that she has been experiencing dizziness for the last week with the sinus infection. Patient states that yesterday evening she was walking across her living room, felt dizzy, thinks she might have lost consciousness briefly, and fell forward hitting her face into a door handle. Patient sustained a laceration to the lip, knee, and is missing teeth. Patient states that she has had syncopal events before, but is currently lethargic and cannot recall the details. Patient denies any fevers, chills, CP, SOB, abdominal pain, N/V/D, headache.  Troponin negative x 1

## 2023-09-20 NOTE — ED ADULT NURSE NOTE - NSFALLRISKFACTORS_ED_ALL_ED
No indicators present
Patient received complaining of right knee pain/bruising, denies trauma, states was on a plane yesterday and was referred by PCP to r/o DVT. Patient is AOx4, ambulatory, safety precautions in place, awaiting evaluation.

## 2023-09-20 NOTE — ED PROVIDER NOTE - PHYSICAL EXAMINATION
General: Crying.   Skin: No rashes or lesions. Approx. 1.5cm left knee laceration. Bleeding controlled.   Head: Normocephalic/atraumatic.   Eyes: Sclera anicteric, conjunctivae clear b/l. PERRLA, EOMI.   Mouth: Multiple missing/loose teeth. Lower lip laceration/split lip. No obvious through and through.   Neck: Supple, FROM. No spinal tenderness.   Cardio: Rate and rhythm regular. No audible murmur.  Resp: Breath sounds vesicular, symmetrical and without rales, rhonchi or wheezing b/l.  MSK: MAEx4. FROM.   Neuro: A&Ox3. General: Crying.   Skin: No rashes or lesions. Approx. 2cm left knee laceration. Approx. 1cm laceration below chin. Bleeding controlled.   Head: Normocephalic/atraumatic.   Eyes: Sclera anicteric, conjunctivae clear b/l. PERRLA, EOMI.   Mouth: Multiple missing/loose teeth. Lower lip laceration/split lip with devitalized tissue. +Through and through.   Neck: Supple, FROM. No spinal tenderness.   Cardio: Rate and rhythm regular. No audible murmur.  Resp: Breath sounds vesicular, symmetrical and without rales, rhonchi or wheezing b/l.  MSK: MAEx4. FROM.   Neuro: A&Ox3.

## 2023-09-20 NOTE — H&P ADULT - ASSESSMENT
49 yo female with no known PMH presents to the ER after a fall. TTE ECHO in ED showed apical hypertrophy, Cardiology consulted and pt to be admitted for further work up.    Syncope with trauma to face   - CT head: No acute intracranial hemorrhage, mass effect, or osseous fracture.  - CT maxillofacial: Swelling and skin thickening overlying the mandible with multiple foci of air consistent with laceration.  - CT Spine: No acute cervical spine fracture or traumatic malalignment  - EKG with diffuse ST depression  - Trop x 2 -ve   - TTE ECHO: LVEF 60 to 65%. apical left ventricular hypertrophy   - Cardio consult appreciated, given clinical presentation needs cardiac MRI to r/o HCM   - monitor on tele  - EP eval     DVT ppx - SCD

## 2023-09-20 NOTE — ED CDU PROVIDER INITIAL DAY NOTE - PHYSICAL EXAMINATION
General: Crying.   Skin: No rashes or lesions. Approx. 2cm left knee laceration. Approx. 1cm laceration below chin. Bleeding controlled.   Head: Normocephalic/atraumatic.   Eyes: Sclera anicteric, conjunctivae clear b/l. PERRLA, EOMI.   Mouth: Multiple missing/loose teeth. Lower lip laceration/split lip with devitalized tissue. +Through and through.   Neck: Supple, FROM. No spinal tenderness.   Cardio: Rate and rhythm regular. No audible murmur.  Resp: Breath sounds vesicular, symmetrical and without rales, rhonchi or wheezing b/l.  MSK: MAEx4. FROM.   Neuro: A&Ox3.

## 2023-09-20 NOTE — ED ADULT NURSE NOTE - OBJECTIVE STATEMENT
Pt c/o of her teeth being knocked out after a fall due to a vertigo episode. Pt alert and highly distressed due to situation

## 2023-09-20 NOTE — ED CDU PROVIDER INITIAL DAY NOTE - ATTENDING APP SHARED VISIT CONTRIBUTION OF CARE
Patient seen and evaluated on a.m. rounds agree with observation and treatment plan Patient seen and evaluated on a.m. rounds agree with observation and treatment plan.    I personally saw the patient with the PA, and completed the key components of the history and physical exam. I then discussed the management plan with the PA.

## 2023-09-20 NOTE — ED CDU PROVIDER INITIAL DAY NOTE - OBJECTIVE STATEMENT
49 yo female no PMHx presents to ED c/o fall. Patient has been dizzy x1 week, diagnosed with sinus infection. States normally becomes dizzy with sinus infections. Presently on Augmentin; has another four days left. Tonight got up from couch, became dizzy, hit door handle with face. No preceding sxms. Sustained laceration to lip and knee as well as missing teeth. Unwitnessed. Tetanus unknown. No further complaints at this time.   Denies LOC, chest pain, SOB.

## 2023-09-20 NOTE — H&P ADULT - NSHPREVIEWOFSYSTEMS_GEN_ALL_CORE
REVIEW OF SYSTEMS:    CONSTITUTIONAL: No weakness, fevers or chills  EYES/ENT: No visual changes;  No vertigo or throat pain   NECK: No pain or stiffness  RESPIRATORY: No cough, wheezing, hemoptysis; No shortness of breath  CARDIOVASCULAR: No chest pain or palpitations  GASTROINTESTINAL: No abdominal or epigastric pain. No nausea, vomiting, or hematemesis; No diarrhea or constipation. No melena or hematochezia.  GENITOURINARY: No dysuria, frequency or hematuria  NEUROLOGICAL: No numbness or weakness  Psych: No depression, anxiety  SKIN: No itching, rashes REVIEW OF SYSTEMS:    CONSTITUTIONAL: No weakness, fevers or chills  EYES/ENT: No visual changes;  No vertigo or throat pain   NECK: No pain or stiffness  RESPIRATORY: No cough, wheezing, hemoptysis; No shortness of breath  CARDIOVASCULAR: No chest pain or palpitations  GASTROINTESTINAL: No abdominal or epigastric pain. No nausea, vomiting. No diarrhea or constipation.   GENITOURINARY: No dysuria, frequency or hematuria  NEUROLOGICAL: per HPI   Psych: No depression, anxiety  SKIN: No itching, rashes

## 2023-09-20 NOTE — PATIENT PROFILE ADULT - FALL HARM RISK - HARM RISK INTERVENTIONS

## 2023-09-20 NOTE — ED CDU PROVIDER DISPOSITION NOTE - NS ED ATTENDING STATEMENT MOD
This was a shared visit with the OUMOU. I reviewed and verified the documentation and independently performed the documented: Attending with

## 2023-09-20 NOTE — ED PROVIDER NOTE - CLINICAL SUMMARY MEDICAL DECISION MAKING FREE TEXT BOX
sudden onset 47 yo female no PMHx presents to ED c/o fall. Patient with sinus infection which caused dizziness per patient, subsequently falling. Multiple teeth now missing, multiple lacerations repaired. CTs negative. Patient with abnormal EKG. Agreeable to OBS for cardiology consult.

## 2023-09-21 LAB
ANION GAP SERPL CALC-SCNC: 11 MMOL/L — SIGNIFICANT CHANGE UP (ref 5–17)
BASOPHILS # BLD AUTO: 0.03 K/UL — SIGNIFICANT CHANGE UP (ref 0–0.2)
BASOPHILS NFR BLD AUTO: 0.3 % — SIGNIFICANT CHANGE UP (ref 0–2)
BLD GP AB SCN SERPL QL: SIGNIFICANT CHANGE UP
BUN SERPL-MCNC: 9.4 MG/DL — SIGNIFICANT CHANGE UP (ref 8–20)
CALCIUM SERPL-MCNC: 9.6 MG/DL — SIGNIFICANT CHANGE UP (ref 8.4–10.5)
CHLORIDE SERPL-SCNC: 98 MMOL/L — SIGNIFICANT CHANGE UP (ref 96–108)
CO2 SERPL-SCNC: 25 MMOL/L — SIGNIFICANT CHANGE UP (ref 22–29)
CREAT SERPL-MCNC: 0.81 MG/DL — SIGNIFICANT CHANGE UP (ref 0.5–1.3)
EGFR: 88 ML/MIN/1.73M2 — SIGNIFICANT CHANGE UP
EOSINOPHIL # BLD AUTO: 0.17 K/UL — SIGNIFICANT CHANGE UP (ref 0–0.5)
EOSINOPHIL NFR BLD AUTO: 1.8 % — SIGNIFICANT CHANGE UP (ref 0–6)
GLUCOSE SERPL-MCNC: 104 MG/DL — HIGH (ref 70–99)
HCT VFR BLD CALC: 48.4 % — HIGH (ref 34.5–45)
HGB BLD-MCNC: 16.3 G/DL — HIGH (ref 11.5–15.5)
IMM GRANULOCYTES NFR BLD AUTO: 0.3 % — SIGNIFICANT CHANGE UP (ref 0–0.9)
LYMPHOCYTES # BLD AUTO: 2.06 K/UL — SIGNIFICANT CHANGE UP (ref 1–3.3)
LYMPHOCYTES # BLD AUTO: 21.9 % — SIGNIFICANT CHANGE UP (ref 13–44)
MCHC RBC-ENTMCNC: 31.7 PG — SIGNIFICANT CHANGE UP (ref 27–34)
MCHC RBC-ENTMCNC: 33.7 GM/DL — SIGNIFICANT CHANGE UP (ref 32–36)
MCV RBC AUTO: 94 FL — SIGNIFICANT CHANGE UP (ref 80–100)
MONOCYTES # BLD AUTO: 0.8 K/UL — SIGNIFICANT CHANGE UP (ref 0–0.9)
MONOCYTES NFR BLD AUTO: 8.5 % — SIGNIFICANT CHANGE UP (ref 2–14)
NEUTROPHILS # BLD AUTO: 6.33 K/UL — SIGNIFICANT CHANGE UP (ref 1.8–7.4)
NEUTROPHILS NFR BLD AUTO: 67.2 % — SIGNIFICANT CHANGE UP (ref 43–77)
PLATELET # BLD AUTO: 184 K/UL — SIGNIFICANT CHANGE UP (ref 150–400)
POTASSIUM SERPL-MCNC: 4.2 MMOL/L — SIGNIFICANT CHANGE UP (ref 3.5–5.3)
POTASSIUM SERPL-SCNC: 4.2 MMOL/L — SIGNIFICANT CHANGE UP (ref 3.5–5.3)
RBC # BLD: 5.15 M/UL — SIGNIFICANT CHANGE UP (ref 3.8–5.2)
RBC # FLD: 12.6 % — SIGNIFICANT CHANGE UP (ref 10.3–14.5)
SODIUM SERPL-SCNC: 134 MMOL/L — LOW (ref 135–145)
WBC # BLD: 9.42 K/UL — SIGNIFICANT CHANGE UP (ref 3.8–10.5)
WBC # FLD AUTO: 9.42 K/UL — SIGNIFICANT CHANGE UP (ref 3.8–10.5)

## 2023-09-21 PROCEDURE — 99232 SBSQ HOSP IP/OBS MODERATE 35: CPT

## 2023-09-21 PROCEDURE — 75561 CARDIAC MRI FOR MORPH W/DYE: CPT | Mod: 26

## 2023-09-21 PROCEDURE — 75574 CT ANGIO HRT W/3D IMAGE: CPT | Mod: 26

## 2023-09-21 PROCEDURE — 99233 SBSQ HOSP IP/OBS HIGH 50: CPT

## 2023-09-21 RX ORDER — ALPRAZOLAM 0.25 MG
0.25 TABLET ORAL EVERY 8 HOURS
Refills: 0 | Status: DISCONTINUED | OUTPATIENT
Start: 2023-09-21 | End: 2023-09-22

## 2023-09-21 RX ORDER — OXYCODONE HYDROCHLORIDE 5 MG/1
2.5 TABLET ORAL EVERY 4 HOURS
Refills: 0 | Status: DISCONTINUED | OUTPATIENT
Start: 2023-09-21 | End: 2023-09-22

## 2023-09-21 RX ORDER — CHLORHEXIDINE GLUCONATE 213 G/1000ML
1 SOLUTION TOPICAL ONCE
Refills: 0 | Status: COMPLETED | OUTPATIENT
Start: 2023-09-21 | End: 2023-09-22

## 2023-09-21 RX ORDER — METOPROLOL TARTRATE 50 MG
25 TABLET ORAL ONCE
Refills: 0 | Status: COMPLETED | OUTPATIENT
Start: 2023-09-21 | End: 2023-09-21

## 2023-09-21 RX ORDER — SENNA PLUS 8.6 MG/1
2 TABLET ORAL AT BEDTIME
Refills: 0 | Status: DISCONTINUED | OUTPATIENT
Start: 2023-09-21 | End: 2023-09-22

## 2023-09-21 RX ORDER — OXYCODONE HYDROCHLORIDE 5 MG/1
5 TABLET ORAL EVERY 4 HOURS
Refills: 0 | Status: DISCONTINUED | OUTPATIENT
Start: 2023-09-21 | End: 2023-09-22

## 2023-09-21 RX ORDER — POLYETHYLENE GLYCOL 3350 17 G/17G
17 POWDER, FOR SOLUTION ORAL DAILY
Refills: 0 | Status: DISCONTINUED | OUTPATIENT
Start: 2023-09-21 | End: 2023-09-22

## 2023-09-21 RX ADMIN — Medication 650 MILLIGRAM(S): at 00:12

## 2023-09-21 RX ADMIN — Medication 0.25 MILLIGRAM(S): at 08:54

## 2023-09-21 RX ADMIN — OXYCODONE HYDROCHLORIDE 5 MILLIGRAM(S): 5 TABLET ORAL at 08:54

## 2023-09-21 RX ADMIN — OXYCODONE HYDROCHLORIDE 5 MILLIGRAM(S): 5 TABLET ORAL at 20:51

## 2023-09-21 RX ADMIN — SENNA PLUS 2 TABLET(S): 8.6 TABLET ORAL at 20:53

## 2023-09-21 RX ADMIN — OXYCODONE HYDROCHLORIDE 5 MILLIGRAM(S): 5 TABLET ORAL at 09:30

## 2023-09-21 RX ADMIN — Medication 25 MILLIGRAM(S): at 08:53

## 2023-09-21 RX ADMIN — Medication 400 MILLIGRAM(S): at 06:39

## 2023-09-21 RX ADMIN — OXYCODONE HYDROCHLORIDE 5 MILLIGRAM(S): 5 TABLET ORAL at 15:40

## 2023-09-21 RX ADMIN — Medication 0.25 MILLIGRAM(S): at 17:51

## 2023-09-21 NOTE — PROGRESS NOTE ADULT - SUBJECTIVE AND OBJECTIVE BOX
s/p cardiac MRI. Result pending  Echo reviewed.   Discussed ICD implant with patient - she was very tearful and not mentally ready  C/o pain lips/face - site of her injuries       EKG: SR  TELE:    MEDICATIONS  (STANDING):  influenza   Vaccine 0.5 milliLiter(s) IntraMuscular once    MEDICATIONS  (PRN):  acetaminophen     Tablet .. 650 milliGRAM(s) Oral every 6 hours PRN Temp greater or equal to 38C (100.4F), Mild Pain (1 - 3)  ALPRAZolam 0.25 milliGRAM(s) Oral every 8 hours PRN anxiety  oxyCODONE    IR 2.5 milliGRAM(s) Oral every 4 hours PRN Moderate Pain (4 - 6)  oxyCODONE    IR 5 milliGRAM(s) Oral every 4 hours PRN Severe Pain (7 - 10)      Allergies    sulfonamides (Rash)    Intolerances      PAST MEDICAL & SURGICAL HISTORY:  No pertinent past medical history      No significant past surgical history          Vital Signs Last 24 Hrs  T(C): 37.1 (21 Sep 2023 08:30), Max: 37.2 (20 Sep 2023 15:47)  T(F): 98.8 (21 Sep 2023 08:30), Max: 98.9 (20 Sep 2023 15:47)  HR: 67 (21 Sep 2023 08:30) (67 - 81)  BP: 104/76 (21 Sep 2023 08:30) (96/71 - 120/79)  BP(mean): --  RR: 16 (21 Sep 2023 08:30) (16 - 18)  SpO2: 98% (21 Sep 2023 08:30) (95% - 98%)    Parameters below as of 21 Sep 2023 08:30  Patient On (Oxygen Delivery Method): room air        Physical Exam:  Constitutional: NAD, AAOx3  Cardiovascular: +S1S2 RRR  Pulmonary: CTA b/l, unlabored  Abd: soft NTND +BS  Extremities: no pedal edema, +distal pulses b/l  Neuro: non focal  Oral - no bleeding; lip lac healing ; no swelling noted  LABS:                        16.3   9.42  )-----------( 184      ( 21 Sep 2023 06:58 )             48.4     09-21    134<L>  |  98  |  9.4  ----------------------------<  104<H>  4.2   |  25.0  |  0.81    Ca    9.6      21 Sep 2023 06:58    TPro  7.3  /  Alb  4.5  /  TBili  0.9  /  DBili  x   /  AST  18  /  ALT  19  /  AlkPhos  59  09-20      Urinalysis Basic - ( 21 Sep 2023 06:58 )    Color: x / Appearance: x / SG: x / pH: x  Gluc: 104 mg/dL / Ketone: x  / Bili: x / Urobili: x   Blood: x / Protein: x / Nitrite: x   Leuk Esterase: x / RBC: x / WBC x   Sq Epi: x / Non Sq Epi: x / Bacteria: x

## 2023-09-21 NOTE — PROGRESS NOTE ADULT - SUBJECTIVE AND OBJECTIVE BOX
CHIEF COMPLAINT/INTERVAL HISTORY:    Patient is a 48y old  Female who presents with a chief complaint of Syncope (21 Sep 2023 14:05)    SUBJECTIVE & OBJECTIVE: Pt seen and examined at bedside. No overnight events. Emotional and tearful today. Complaining of mouth fall s/p fall. ICD postponed. Cardiac CT and MRI performed; concerns for apical HOCM.    ROS: No chest pain, palpitations, SOB, light headedness, dizziness, headache, nausea/vomiting, fevers/chills, abdominal pain.    ICU Vital Signs Last 24 Hrs  T(C): 37.1 (21 Sep 2023 08:30), Max: 37.1 (20 Sep 2023 19:38)  T(F): 98.8 (21 Sep 2023 08:30), Max: 98.8 (21 Sep 2023 08:30)  HR: 67 (21 Sep 2023 08:30) (67 - 81)  BP: 104/76 (21 Sep 2023 08:30) (96/71 - 120/79)  RR: 16 (21 Sep 2023 08:30) (16 - 18)  SpO2: 98% (21 Sep 2023 08:30) (97% - 98%)    O2 Parameters below as of 21 Sep 2023 08:30  Patient On (Oxygen Delivery Method): room air      MEDICATIONS  (STANDING):  influenza   Vaccine 0.5 milliLiter(s) IntraMuscular once  polyethylene glycol 3350 17 Gram(s) Oral daily  senna 2 Tablet(s) Oral at bedtime    MEDICATIONS  (PRN):  acetaminophen     Tablet .. 650 milliGRAM(s) Oral every 6 hours PRN Temp greater or equal to 38C (100.4F), Mild Pain (1 - 3)  ALPRAZolam 0.25 milliGRAM(s) Oral every 8 hours PRN anxiety  oxyCODONE    IR 2.5 milliGRAM(s) Oral every 4 hours PRN Moderate Pain (4 - 6)  oxyCODONE    IR 5 milliGRAM(s) Oral every 4 hours PRN Severe Pain (7 - 10)      LABS:                        16.3   9.42  )-----------( 184      ( 21 Sep 2023 06:58 )             48.4     09-21    134<L>  |  98  |  9.4  ----------------------------<  104<H>  4.2   |  25.0  |  0.81    Ca    9.6      21 Sep 2023 06:58    TPro  7.3  /  Alb  4.5  /  TBili  0.9  /  DBili  x   /  AST  18  /  ALT  19  /  AlkPhos  59  09-20      Urinalysis Basic - ( 21 Sep 2023 06:58 )    Color: x / Appearance: x / SG: x / pH: x  Gluc: 104 mg/dL / Ketone: x  / Bili: x / Urobili: x   Blood: x / Protein: x / Nitrite: x   Leuk Esterase: x / RBC: x / WBC x   Sq Epi: x / Non Sq Epi: x / Bacteria: x      PHYSICAL EXAM:    GENERAL: middle aged female, sitting in bed, emotional  HEAD:  Atraumatic, Normocephalic  EYES: EOMI, PERRLA, conjunctiva and sclera clear  ENMT: Moist mucous membranes, lip laceration with scabbing  NECK: Supple   NERVOUS SYSTEM:  Alert & Oriented X3, Motor Strength 5/5 B/L upper and lower extremities   CHEST/LUNG: Clear to auscultation bilaterally; No rales, rhonchi, wheezing, or rubs  HEART: Regular rate and rhythm; + S1/S2  ABDOMEN: Soft, Nontender, Nondistended; Bowel sounds present  EXTREMITIES:  no pedal edema

## 2023-09-21 NOTE — PROGRESS NOTE ADULT - ASSESSMENT
A/P: Patient is a 47 y/o F active smoker with no pertinent PMHx who presented to the ED after a unwitnessed fall. Patient states about a week ago she was diagnosed with a sinus infection and started on Augmentin. Patient states that she has been experiencing dizziness for the last week with the sinus infection. Patient states that yesterday evening she was walking across her living room, felt dizzy, thinks she might have lost consciousness briefly, and fell forward hitting her face into a door handle. Patient sustained a laceration to the lip, knee, and is missing teeth. Patient states that she has had syncopal events before, but is currently lethargic and cannot recall the details. Patient denies any fevers, chills, CP, SOB, abdominal pain, N/V/D, headache.  Troponin negative x 2

## 2023-09-21 NOTE — PROGRESS NOTE ADULT - SUBJECTIVE AND OBJECTIVE BOX
[facility]                                                         CARDIOLOGY AT [facility]                                                                  [address], [place] [address]                                                         Telephone: [phone]. Fax:[phone]                                                                             PROGRESS NOTE    Reason for follow up: Syncope, HCM  Update: Patient's echocardiogram showed apical LVH. EP evaluating for ICD at this time. Plan for CTA cardiac and cardiac MRI today. Patient still with severe gum pain.       Review of symptoms:   Cardiac:  No chest pain. No dyspnea. No palpitations.  Respiratory: no cough. No dyspnea  Gastrointestinal: No diarrhea. No abdominal pain. No bleeding.   Neuro: No focal neuro complaints.    Vitals:  T(C): 37.1 (09-21-23 @ 08:30), Max: 37.2 (09-20-23 @ 12:11)  HR: 67 (09-21-23 @ 08:30) (67 - 89)  BP: 104/76 (09-21-23 @ 08:30) (96/71 - 137/74)  RR: 16 (09-21-23 @ 08:30) (16 - 18)  SpO2: 98% (09-21-23 @ 08:30) (95% - 99%)  Wt(kg): --  I&O's Summary    20 Sep 2023 07:01  -  21 Sep 2023 07:00  --------------------------------------------------------  IN: 100 mL / OUT: 0 mL / NET: 100 mL      Weight (kg): 71 (09-20 @ 03:38)    PHYSICAL EXAM:  Constitutional: Comfortable . No acute distress.   HEENT: Atraumatic and normocephalic , neck is supple . no JVD. No carotid bruit. Dried blood around mouth, lip laceration  CNS: A&Ox3. No focal deficits.   Respiratory: CTAB, unlabored   Cardiovascular: RRR normal s1 s2. No murmur. No rubs or gallop.  Gastrointestinal: Soft, non-tender. +Bowel sounds.   Extremities: 2+ Peripheral Pulses, No clubbing, cyanosis, or edema  Psychiatric: Calm . no agitation.   Skin: Warm and dry, no ulcers on extremities     CURRENT CARDIAC MEDICATIONS:      CURRENT OTHER MEDICATIONS:  acetaminophen     Tablet .. 650 milliGRAM(s) Oral every 6 hours PRN Temp greater or equal to 38C (100.4F), Mild Pain (1 - 3)  ALPRAZolam 0.25 milliGRAM(s) Oral every 8 hours PRN anxiety  oxyCODONE    IR 5 milliGRAM(s) Oral every 4 hours PRN Severe Pain (7 - 10)  oxyCODONE    IR 2.5 milliGRAM(s) Oral every 4 hours PRN Moderate Pain (4 - 6)  influenza   Vaccine 0.5 milliLiter(s) IntraMuscular once      LABS:	 	  ( 20 Sep 2023 09:43 )  Troponin T  <0.01,  CPK  X    , CKMB  X    , BNP X        , ( 20 Sep 2023 02:27 )  Troponin T  <0.01,  CPK  X    , CKMB  X    , BNP X                                  16.3   9.42  )-----------( 184      ( 21 Sep 2023 06:58 )             48.4     09-21    134<L>  |  98  |  9.4  ----------------------------<  104<H>  4.2   |  25.0  |  0.81    Ca    9.6      21 Sep 2023 06:58    TPro  7.3  /  Alb  4.5  /  TBili  0.9  /  DBili  x   /  AST  18  /  ALT  19  /  AlkPhos  59  09-20      Lipid Profile:   HgA1c:   TSH:     TELEMETRY: SR  ECG:    DIAGNOSTIC TESTING:  [ ] Echocardiogram:   < from: TTE Echo Complete w/ Contrast w/ Doppler (09.20.23 @ 08:25) >  PHYSICIAN INTERPRETATION:  Left Ventricle: The left ventricular internal cavity size is normal.  Global LV systolic function was normal. Left ventricular ejection   fraction, by visual estimation, is 60 to 65%. Spectral Doppler shows   normal pattern of LV diastolic filling.  Right Ventricle: Normal right ventricular size and function.  Pericardium: There is no evidence of pericardial effusion.  Mitral Valve: Structurally normal mitral valve, with normal leaflet   excursion.  Tricuspid Valve: Structurally normal tricuspid valve, with normal leaflet   excursion.  Aortic Valve: The aortic valve is trileaflet. No evidence of aortic   stenosis. No evidence of aortic valve regurgitation is seen.  Pulmonic Valve: The pulmonic valve was not well visualized.  Aorta: The aortic root and ascending aorta are structurally normal, with   no evidence of dilitation.  Pulmonary Artery: The pulmonary artery is not well seen.  Venous: The inferior vena cava is not well visualized.  In comparison to the previous echocardiogram(s): There are no prior   studies on this patient for comparison purposes.      Summary:   1. Left ventricular ejection fraction, by visual estimation, is 60 to   65%.   2. Normal global left ventricular systolic function.   3. There is apical left ventricular hypertrophy in spade-like pattern,   recommend cardiac MRI to confirm apical HCM. No evidence of apical   aneurysm or thrombus with intravenous echocontrast.   4. Global longitudinal strain values reduced at the LV apex.   5. Normal right ventricular size and function, inadequate estimation of   RVSP.   6. There is no evidence of pericardial effusion.    Nasim Aguirre DO Electronically signed on 9/20/2023 at 10:05:12 AM    < end of copied text >    [ ]  Catheterization:  [ ] Stress Test:    OTHER:

## 2023-09-21 NOTE — PROGRESS NOTE ADULT - PROBLEM SELECTOR PLAN 2
- EP following.   - Possible ICD implantation pending cardiac MRI results.   - Continue telemetry monitoring.

## 2023-09-21 NOTE — PROGRESS NOTE ADULT - ASSESSMENT
Syncope  resulting in facial trauma.  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  History of multiple sudden syncopal events.  Await result of Cardiac MRI  She was mentally ready to have ICD today  Had a detailed d/w her and will consider doing it tomorrow.

## 2023-09-21 NOTE — PROGRESS NOTE ADULT - ASSESSMENT
47 yo female with no known PMH presents to the ER after a fall. TTE ECHO in ED showed apical hypertrophy, Cardiology consulted and pt to be admitted for further work up.    Syncope likely cardiogenic  - CT head: No acute intracranial hemorrhage, mass effect, or osseous fracture.  - CT maxillofacial: Swelling and skin thickening overlying the mandible with multiple foci of air consistent with laceration.  - CT Spine: No acute cervical spine fracture or traumatic malalignment  - EKG with diffuse ST depression  - Trop  negative  - TTE ECHO: LVEF 60 to 65%. apical left ventricular hypertrophy   - f/u official results of cardiac CT and MRI - concerning for apical hypertrophy   - NPO after midnight for ICD placement  -telemonitoring  - Cardio and EP recs appreciated    Facial pain  -s/p fall with dental trauma  -add analgesic regimen  -easy to chew diet    Anxiety  -xanax as needed  -SW consult    DVT ppx - SCD     Dispo- Remains acute- NPO after midnight for ICD.

## 2023-09-22 ENCOUNTER — TRANSCRIPTION ENCOUNTER (OUTPATIENT)
Age: 48
End: 2023-09-22

## 2023-09-22 VITALS
TEMPERATURE: 98 F | DIASTOLIC BLOOD PRESSURE: 73 MMHG | OXYGEN SATURATION: 99 % | HEART RATE: 62 BPM | RESPIRATION RATE: 18 BRPM | SYSTOLIC BLOOD PRESSURE: 104 MMHG

## 2023-09-22 LAB
ANION GAP SERPL CALC-SCNC: 10 MMOL/L — SIGNIFICANT CHANGE UP (ref 5–17)
BASOPHILS # BLD AUTO: 0.06 K/UL — SIGNIFICANT CHANGE UP (ref 0–0.2)
BASOPHILS NFR BLD AUTO: 0.6 % — SIGNIFICANT CHANGE UP (ref 0–2)
BLD GP AB SCN SERPL QL: SIGNIFICANT CHANGE UP
BUN SERPL-MCNC: 11.3 MG/DL — SIGNIFICANT CHANGE UP (ref 8–20)
CALCIUM SERPL-MCNC: 9.5 MG/DL — SIGNIFICANT CHANGE UP (ref 8.4–10.5)
CHLORIDE SERPL-SCNC: 100 MMOL/L — SIGNIFICANT CHANGE UP (ref 96–108)
CO2 SERPL-SCNC: 26 MMOL/L — SIGNIFICANT CHANGE UP (ref 22–29)
CREAT SERPL-MCNC: 0.82 MG/DL — SIGNIFICANT CHANGE UP (ref 0.5–1.3)
EGFR: 87 ML/MIN/1.73M2 — SIGNIFICANT CHANGE UP
EOSINOPHIL # BLD AUTO: 0.2 K/UL — SIGNIFICANT CHANGE UP (ref 0–0.5)
EOSINOPHIL NFR BLD AUTO: 1.9 % — SIGNIFICANT CHANGE UP (ref 0–6)
GLUCOSE SERPL-MCNC: 116 MG/DL — HIGH (ref 70–99)
HCT VFR BLD CALC: 46.4 % — HIGH (ref 34.5–45)
HGB BLD-MCNC: 15.8 G/DL — HIGH (ref 11.5–15.5)
IMM GRANULOCYTES NFR BLD AUTO: 0.3 % — SIGNIFICANT CHANGE UP (ref 0–0.9)
LYMPHOCYTES # BLD AUTO: 2.28 K/UL — SIGNIFICANT CHANGE UP (ref 1–3.3)
LYMPHOCYTES # BLD AUTO: 22.1 % — SIGNIFICANT CHANGE UP (ref 13–44)
MAGNESIUM SERPL-MCNC: 2.3 MG/DL — SIGNIFICANT CHANGE UP (ref 1.6–2.6)
MCHC RBC-ENTMCNC: 31.7 PG — SIGNIFICANT CHANGE UP (ref 27–34)
MCHC RBC-ENTMCNC: 34.1 GM/DL — SIGNIFICANT CHANGE UP (ref 32–36)
MCV RBC AUTO: 93 FL — SIGNIFICANT CHANGE UP (ref 80–100)
MONOCYTES # BLD AUTO: 0.83 K/UL — SIGNIFICANT CHANGE UP (ref 0–0.9)
MONOCYTES NFR BLD AUTO: 8 % — SIGNIFICANT CHANGE UP (ref 2–14)
NEUTROPHILS # BLD AUTO: 6.92 K/UL — SIGNIFICANT CHANGE UP (ref 1.8–7.4)
NEUTROPHILS NFR BLD AUTO: 67.1 % — SIGNIFICANT CHANGE UP (ref 43–77)
PHOSPHATE SERPL-MCNC: 2.9 MG/DL — SIGNIFICANT CHANGE UP (ref 2.4–4.7)
PLATELET # BLD AUTO: 181 K/UL — SIGNIFICANT CHANGE UP (ref 150–400)
POTASSIUM SERPL-MCNC: 4.4 MMOL/L — SIGNIFICANT CHANGE UP (ref 3.5–5.3)
POTASSIUM SERPL-SCNC: 4.4 MMOL/L — SIGNIFICANT CHANGE UP (ref 3.5–5.3)
RBC # BLD: 4.99 M/UL — SIGNIFICANT CHANGE UP (ref 3.8–5.2)
RBC # FLD: 12.5 % — SIGNIFICANT CHANGE UP (ref 10.3–14.5)
SODIUM SERPL-SCNC: 136 MMOL/L — SIGNIFICANT CHANGE UP (ref 135–145)
WBC # BLD: 10.32 K/UL — SIGNIFICANT CHANGE UP (ref 3.8–10.5)
WBC # FLD AUTO: 10.32 K/UL — SIGNIFICANT CHANGE UP (ref 3.8–10.5)

## 2023-09-22 PROCEDURE — 86901 BLOOD TYPING SEROLOGIC RH(D): CPT

## 2023-09-22 PROCEDURE — 75561 CARDIAC MRI FOR MORPH W/DYE: CPT

## 2023-09-22 PROCEDURE — 80307 DRUG TEST PRSMV CHEM ANLYZR: CPT

## 2023-09-22 PROCEDURE — 99239 HOSP IP/OBS DSCHRG MGMT >30: CPT

## 2023-09-22 PROCEDURE — 85025 COMPLETE CBC W/AUTO DIFF WBC: CPT

## 2023-09-22 PROCEDURE — 84100 ASSAY OF PHOSPHORUS: CPT

## 2023-09-22 PROCEDURE — 12011 RPR F/E/E/N/L/M 2.5 CM/<: CPT

## 2023-09-22 PROCEDURE — 72125 CT NECK SPINE W/O DYE: CPT | Mod: MA

## 2023-09-22 PROCEDURE — 84484 ASSAY OF TROPONIN QUANT: CPT

## 2023-09-22 PROCEDURE — 33285 INSJ SUBQ CAR RHYTHM MNTR: CPT

## 2023-09-22 PROCEDURE — 36415 COLL VENOUS BLD VENIPUNCTURE: CPT

## 2023-09-22 PROCEDURE — C8929: CPT

## 2023-09-22 PROCEDURE — 80053 COMPREHEN METABOLIC PANEL: CPT

## 2023-09-22 PROCEDURE — 86850 RBC ANTIBODY SCREEN: CPT

## 2023-09-22 PROCEDURE — 71045 X-RAY EXAM CHEST 1 VIEW: CPT

## 2023-09-22 PROCEDURE — G0378: CPT

## 2023-09-22 PROCEDURE — 90715 TDAP VACCINE 7 YRS/> IM: CPT

## 2023-09-22 PROCEDURE — 86900 BLOOD TYPING SEROLOGIC ABO: CPT

## 2023-09-22 PROCEDURE — 83735 ASSAY OF MAGNESIUM: CPT

## 2023-09-22 PROCEDURE — C1764: CPT

## 2023-09-22 PROCEDURE — 93005 ELECTROCARDIOGRAM TRACING: CPT

## 2023-09-22 PROCEDURE — 74018 RADEX ABDOMEN 1 VIEW: CPT

## 2023-09-22 PROCEDURE — 70486 CT MAXILLOFACIAL W/O DYE: CPT | Mod: MA

## 2023-09-22 PROCEDURE — 70450 CT HEAD/BRAIN W/O DYE: CPT | Mod: MA

## 2023-09-22 PROCEDURE — 99285 EMERGENCY DEPT VISIT HI MDM: CPT | Mod: 25

## 2023-09-22 PROCEDURE — 80048 BASIC METABOLIC PNL TOTAL CA: CPT

## 2023-09-22 PROCEDURE — 12001 RPR S/N/AX/GEN/TRNK 2.5CM/<: CPT

## 2023-09-22 PROCEDURE — 84702 CHORIONIC GONADOTROPIN TEST: CPT

## 2023-09-22 PROCEDURE — 75574 CT ANGIO HRT W/3D IMAGE: CPT

## 2023-09-22 RX ORDER — NALOXONE HYDROCHLORIDE 4 MG/.1ML
4 SPRAY NASAL
Qty: 1 | Refills: 0
Start: 2023-09-22 | End: 2023-09-24

## 2023-09-22 RX ORDER — CEFAZOLIN SODIUM 1 G
2000 VIAL (EA) INJECTION ONCE
Refills: 0 | Status: COMPLETED | OUTPATIENT
Start: 2023-09-22 | End: 2023-09-22

## 2023-09-22 RX ORDER — OXYCODONE HYDROCHLORIDE 5 MG/1
1 TABLET ORAL
Qty: 18 | Refills: 0
Start: 2023-09-22 | End: 2023-09-24

## 2023-09-22 RX ORDER — ALPRAZOLAM 0.25 MG
1 TABLET ORAL
Qty: 9 | Refills: 0
Start: 2023-09-22 | End: 2023-09-24

## 2023-09-22 RX ORDER — CEFAZOLIN SODIUM 1 G
2000 VIAL (EA) INJECTION ONCE
Refills: 0 | Status: DISCONTINUED | OUTPATIENT
Start: 2023-09-22 | End: 2023-09-22

## 2023-09-22 RX ORDER — METOPROLOL TARTRATE 50 MG
1 TABLET ORAL
Qty: 30 | Refills: 0
Start: 2023-09-22 | End: 2023-10-21

## 2023-09-22 RX ADMIN — OXYCODONE HYDROCHLORIDE 5 MILLIGRAM(S): 5 TABLET ORAL at 02:05

## 2023-09-22 RX ADMIN — Medication 0.25 MILLIGRAM(S): at 08:12

## 2023-09-22 RX ADMIN — Medication 2000 MILLIGRAM(S): at 11:59

## 2023-09-22 RX ADMIN — OXYCODONE HYDROCHLORIDE 5 MILLIGRAM(S): 5 TABLET ORAL at 09:10

## 2023-09-22 RX ADMIN — OXYCODONE HYDROCHLORIDE 5 MILLIGRAM(S): 5 TABLET ORAL at 08:11

## 2023-09-22 RX ADMIN — CHLORHEXIDINE GLUCONATE 1 APPLICATION(S): 213 SOLUTION TOPICAL at 08:49

## 2023-09-22 NOTE — DISCHARGE NOTE PROVIDER - CARE PROVIDER_API CALL
Nasim Johnson  Cardiovascular Disease  [address] [place] [address], [place] [id number]  Phone: [phone]  Fax: [phone]  Follow Up Time:     Mando Barclay  Cardiac Electrophysiology  [address] [place] [address], [place] [id number]  Phone: [phone]  Fax: [phone]  Follow Up Time:

## 2023-09-22 NOTE — DISCHARGE NOTE NURSING/CASE MANAGEMENT/SOCIAL WORK - NSDCPEFALRISK_GEN_ALL_CORE
For information on Fall & Injury Prevention, visit: [email or web address] OR  [email or web address] OR  https://www.cdc.gov/steadi/patient.html

## 2023-09-22 NOTE — PROGRESS NOTE ADULT - PROBLEM SELECTOR PLAN 2
- EP following.   - ILR today.   - Continue telemetry monitoring.      No further inpatient cardiac work up at this time.  Will sign off.  Please reconsult if needed. - EP following.   - ILR today.   - Continue telemetry monitoring.      No further inpatient cardiac work up at this time. Patient should follow up with Dr. Fernandes and EP team as OP within 2 weeks.  Will sign off.  Please reconsult if needed.

## 2023-09-22 NOTE — DISCHARGE NOTE PROVIDER - NSDCCPCAREPLAN_GEN_ALL_CORE_FT
PRINCIPAL DISCHARGE DIAGNOSIS  Diagnosis: Syncope  Assessment and Plan of Treatment: - CT head: No acute intracranial hemorrhage, mass effect, or osseous fracture.  - CT maxillofacial: Swelling and skin thickening overlying the mandible with multiple foci of air consistent with laceration.  - CT Spine: No acute cervical spine fracture or traumatic malalignment  No structural signs of HOCM  S/P ILR 9/22  Follow up with your Cardiologist      SECONDARY DISCHARGE DIAGNOSES  Diagnosis: Fall  Assessment and Plan of Treatment: Facial trauma/broken teeth  Outpatient Dental recommended     PRINCIPAL DISCHARGE DIAGNOSIS  Diagnosis: Syncope  Assessment and Plan of Treatment: - CT head: No acute intracranial hemorrhage, mass effect, or osseous fracture.  - CT maxillofacial: Swelling and skin thickening overlying the mandible with multiple foci of air consistent with laceration.  - CT Spine: No acute cervical spine fracture or traumatic malalignment  -No structural signs of HOCM but can not be definitely ruled out per cardiology therefore please start Toprol XL 25 mg daily   -S/P ILR 9/22, follow up with Dr. Zhanna nguyễn 2 weeks  -Follow up with your Cardiologist within 1 week      SECONDARY DISCHARGE DIAGNOSES  Diagnosis: Fall  Assessment and Plan of Treatment: Facial trauma/broken teeth  Outpatient Dental recommended

## 2023-09-22 NOTE — PROGRESS NOTE ADULT - NS ATTEND AMEND GEN_ALL_CORE FT
Reviewed cMRI findings - not conclusive for hypertrophic cardiomyopathy but the diagnosis cannot be ruled out . However, no high risk features and no LV scar seen. Patient known to have baseline low BP/hypotensive and all syncopal episodes in the past occurred while standing and none sitting/laying down- suggesting a non-arrhythmic etiology to be more likely (orthostasis/vagal). Given lack of a clear diagnosis of hypertrophic cardiomyopathy, low risk features on cMRI even if HCM, and syncope of doubtful arrhythmic etiology, I discussed with the patient in detail the benefits and risks of the various therapeutic and monitoring options available - specifically a loop recorder implant for further monitoring to determine etiology of syncope vs an ICD implant for prevention of sudden death. Either approach could be reasonable given above findings. The patient expressed understanding and elected to undergo a loop recorder implant. We also discussed that based on findings from ILR monitoring she may still potentially require an ICD in the future and risk of sudden death although low cannot be completely excluded by this approach.
seen with above,    48M no significant past medical history with recent sinus infection on antibiotic with intermittent dizziness, had an episode of traumatic fall with questionable syncope at home with lip laceration and lost of teeth presented to the ER, EKG with diffuse ST depression and short [place] interval, she does not recollect any prior EKG or with her PCP during annual physical, also remember an episode of syncope last year did not get any cardiac evaluation. Denies family history of sudden cardiac death, has siblings and 2 adults children who are healthy .  -TTE done with apical hypertrophy without obstructive gradient, given clinical presentation and Echo findings recommend cardiac MRI to exclude apical HCM and to assess for scar burden.   -CMRI not done today  -EP follow re: ILR implantation  -ICD if scar burden

## 2023-09-22 NOTE — DISCHARGE NOTE PROVIDER - NSDCMRMEDTOKEN_GEN_ALL_CORE_FT
ALPRAZolam 0.25 mg oral tablet: 1 tab(s) orally every 8 hours as needed for anxiety MDD: 3 tabs  oxyCODONE 5 mg oral tablet: 1 tab(s) orally every 4 hours as needed for Severe Pain (7 - 10) MDD: 6 tabs   ALPRAZolam 0.25 mg oral tablet: 1 tab(s) orally every 8 hours as needed for anxiety MDD: 3 tabs  oxyCODONE 5 mg oral tablet: 1 tab(s) orally every 4 hours as needed for Severe Pain (7 - 10) MDD: 6 tabs  Toprol-XL 25 mg oral tablet, extended release: 1 tab(s) orally once a day

## 2023-09-22 NOTE — DISCHARGE NOTE PROVIDER - NSDCCPTREATMENT_GEN_ALL_CORE_FT
PRINCIPAL PROCEDURE  Procedure: Insertion, loop recorder  Findings and Treatment: Loop Recorder Incision Care:     - Do not touch the incision until it is completely healed.   - There is Dermabond (skin glue) on your incision, which will start to flake off on its own over the next 2-3 weeks. Do not pick at or peel off the Dermabond.   - Do not apply soaps, creams, lotions, ointments or powders to the incision until it is completely healed.  - You should call the doctor if you notice redness, drainage, swelling, increased tenderness, hot sensation around the  incision, bleeding or incision edges pulling apart.

## 2023-09-22 NOTE — DISCHARGE NOTE PROVIDER - DISCHARGE DIET
Other Diet Instructions/Easy to Chew Diet Regular Diet - No restrictions/Other Diet Instructions/Easy to Chew Diet

## 2023-09-22 NOTE — PROGRESS NOTE ADULT - SUBJECTIVE AND OBJECTIVE BOX
Subjective:  Pt seen and evaluated with physician at bedside. Cardiac MRI performed and revealed a "[place]-shaped appearance of the left ventricle with systolic apical cavity obliteration, maximum wall thickness is 13 mm. While the wall thickness does not meet size criteria, apical HOCM remains on the differential. Hyperdynamic left ventricle (ejection fraction=74%). No evidence of myocardial scar". Results discussed with patient and given unclear HOCM diagnosed and syncopal episodes clinically consistent with orthostasis will defer ICD implant at this time and implant an ILR for continuing monitoring and to correlate with symptoms. Pt agreeable.         MEDICATIONS  (STANDING):  ceFAZolin   IVPB 2000 milliGRAM(s) IV Intermittent once  influenza   Vaccine 0.5 milliLiter(s) IntraMuscular once  polyethylene glycol 3350 17 Gram(s) Oral daily  senna 2 Tablet(s) Oral at bedtime    MEDICATIONS  (PRN):  acetaminophen     Tablet .. 650 milliGRAM(s) Oral every 6 hours PRN Temp greater or equal to 38C (100.4F), Mild Pain (1 - 3)  ALPRAZolam 0.25 milliGRAM(s) Oral every 8 hours PRN anxiety  oxyCODONE    IR 2.5 milliGRAM(s) Oral every 4 hours PRN Moderate Pain (4 - 6)  oxyCODONE    IR 5 milliGRAM(s) Oral every 4 hours PRN Severe Pain (7 - 10)      Allergies  sulfonamides (Rash)      Vital Signs Last 24 Hrs  T(C): 36.4 (22 Sep 2023 07:49), Max: 37.1 (21 Sep 2023 16:50)  T(F): 97.6 (22 Sep 2023 07:49), Max: 98.7 (21 Sep 2023 16:50)  HR: 62 (22 Sep 2023 07:49) (62 - 79)  BP: 104/73 (22 Sep 2023 07:49) (103/70 - 113/76)  RR: 18 (22 Sep 2023 07:49) (16 - 20)  SpO2: 99% (22 Sep 2023 07:49) (97% - 99%)    Parameters below as of 22 Sep 2023 07:49  Patient On (Oxygen Delivery Method): room air    Physical exam:   Constitutional: NAD  Head: + healing facial laceration, missing dentition  Neck: supple  Cardiac: Heart regular rate and rhythm, no murmurs, rubs, or gallops.  No edema.  Lungs: CTA B/L No W/R/R  Abdomen: soft, nondistended  Neuro: Alert and oriented x 3,        LABS:                        15.8   10.32 )-----------( 181      ( 22 Sep 2023 05:33 )             46.4     09-22    136  |  100  |  11.3  ----------------------------<  116<H>  4.4   |  26.0  |  0.82    Ca    9.5      22 Sep 2023 05:33  Phos  2.9     09-22  Mg     2.3     09-22        Urinalysis Basic - ( 22 Sep 2023 05:33 )    Color: x / Appearance: x / SG: x / pH: x  Gluc: 116 mg/dL / Ketone: x  / Bili: x / Urobili: x   Blood: x / Protein: x / Nitrite: x   Leuk Esterase: x / RBC: x / WBC x   Sq Epi: x / Non Sq Epi: x / Bacteria: x        RADIOLOGY & ADDITIONAL TESTS:  Cardiac MRI 9/21/2023  IMPRESSION:    1. [place]-shaped appearance of the left ventricle with systolic apical   cavity obliteration. The maximum wall thickness is 13 mm. While the wall   thickness does not meet size criteria, apical HOCM remains on the   differential.  2. Hyperdynamic left ventricle (ejection fraction=74%).  3. No evidence of myocardial scar.    --- End of Report ---    TTE 9/20/2023  Summary:   1. Left ventricular ejection fraction, by visual estimation, is 60 to   65%.   2. Normal global left ventricular systolic function.   3. There is apical left ventricular hypertrophy in spade-like pattern,   recommend cardiac MRI to confirm apical HCM. No evidence of apical   aneurysm or thrombus with intravenous echocontrast.   4. Global longitudinal strain values reduced at the LV apex.   5. Normal right ventricular size and function, inadequate estimation of   RVSP.   6. There is no evidence of pericardial effusion.    Nasim Aguirre DO Electronically signed on 9/20/2023 at 10:05:12 AM        *** Final ***     Subjective:  Pt seen and evaluated with physician at bedside. Cardiac MRI performed and revealed a "[place]-shaped appearance of the left ventricle with systolic apical cavity obliteration, maximum wall thickness is 13 mm. While the wall thickness does not meet size criteria, apical HOCM remains on the differential. Hyperdynamic left ventricle (ejection fraction=74%). No evidence of myocardial scar". Results discussed with patient and given unclear HOCM diagnosed and syncopal episodes clinically consistent with orthostasis will defer ICD implant at this time and implant an ILR for continuing monitoring and to correlate with symptoms. Pt agreeable.     TELE: SR w/ rare APCs, no events     MEDICATIONS  (STANDING):  ceFAZolin   IVPB 2000 milliGRAM(s) IV Intermittent once  influenza   Vaccine 0.5 milliLiter(s) IntraMuscular once  polyethylene glycol 3350 17 Gram(s) Oral daily  senna 2 Tablet(s) Oral at bedtime    MEDICATIONS  (PRN):  acetaminophen     Tablet .. 650 milliGRAM(s) Oral every 6 hours PRN Temp greater or equal to 38C (100.4F), Mild Pain (1 - 3)  ALPRAZolam 0.25 milliGRAM(s) Oral every 8 hours PRN anxiety  oxyCODONE    IR 2.5 milliGRAM(s) Oral every 4 hours PRN Moderate Pain (4 - 6)  oxyCODONE    IR 5 milliGRAM(s) Oral every 4 hours PRN Severe Pain (7 - 10)      Allergies  sulfonamides (Rash)      Vital Signs Last 24 Hrs  T(C): 36.4 (22 Sep 2023 07:49), Max: 37.1 (21 Sep 2023 16:50)  T(F): 97.6 (22 Sep 2023 07:49), Max: 98.7 (21 Sep 2023 16:50)  HR: 62 (22 Sep 2023 07:49) (62 - 79)  BP: 104/73 (22 Sep 2023 07:49) (103/70 - 113/76)  RR: 18 (22 Sep 2023 07:49) (16 - 20)  SpO2: 99% (22 Sep 2023 07:49) (97% - 99%)    Parameters below as of 22 Sep 2023 07:49  Patient On (Oxygen Delivery Method): room air    Physical exam:   Constitutional: NAD  Head: + healing facial laceration, missing dentition  Neck: supple  Cardiac: Heart regular rate and rhythm, no murmurs, rubs, or gallops.  No edema.  Lungs: CTA B/L No W/R/R  Abdomen: soft, nondistended  Neuro: Alert and oriented x 3,        LABS:                        15.8   10.32 )-----------( 181      ( 22 Sep 2023 05:33 )             46.4     09-22    136  |  100  |  11.3  ----------------------------<  116<H>  4.4   |  26.0  |  0.82    Ca    9.5      22 Sep 2023 05:33  Phos  2.9     09-22  Mg     2.3     09-22        Urinalysis Basic - ( 22 Sep 2023 05:33 )    Color: x / Appearance: x / SG: x / pH: x  Gluc: 116 mg/dL / Ketone: x  / Bili: x / Urobili: x   Blood: x / Protein: x / Nitrite: x   Leuk Esterase: x / RBC: x / WBC x   Sq Epi: x / Non Sq Epi: x / Bacteria: x        RADIOLOGY & ADDITIONAL TESTS:  Cardiac MRI 9/21/2023  IMPRESSION:    1. [place]-shaped appearance of the left ventricle with systolic apical   cavity obliteration. The maximum wall thickness is 13 mm. While the wall   thickness does not meet size criteria, apical HOCM remains on the   differential.  2. Hyperdynamic left ventricle (ejection fraction=74%).  3. No evidence of myocardial scar.    --- End of Report ---    TTE 9/20/2023  Summary:   1. Left ventricular ejection fraction, by visual estimation, is 60 to   65%.   2. Normal global left ventricular systolic function.   3. There is apical left ventricular hypertrophy in spade-like pattern,   recommend cardiac MRI to confirm apical HCM. No evidence of apical   aneurysm or thrombus with intravenous echocontrast.   4. Global longitudinal strain values reduced at the LV apex.   5. Normal right ventricular size and function, inadequate estimation of   RVSP.   6. There is no evidence of pericardial effusion.    Nasim Aguirre DO Electronically signed on 9/20/2023 at 10:05:12 AM        *** Final ***

## 2023-09-22 NOTE — PROGRESS NOTE ADULT - SUBJECTIVE AND OBJECTIVE BOX
Procedure: Loop Recorder Implant  Electrophysiologist: Mando Barclay MD.    Pt doing well s/p MDT loop recorder implant. Denies complaint.     Incision: Dermabond and steri-strips C/D/I; no bleeding, hematoma, erythema, exudate or edema    Plan:   Keep site dry for 48 hours.  Resume PO intake.   Ambulate w/ assist, then progress as tolerated.     Pain control with PO analgesia PRN.   Resume home medications.   Outpt f/up with Dr. Barclay in 1-2 weeks.

## 2023-09-22 NOTE — DISCHARGE NOTE PROVIDER - ATTENDING DISCHARGE PHYSICAL EXAMINATION:
PHYSICAL EXAM:    GENERAL: middle aged female, sitting in bed, NAD  HEAD:  Atraumatic, Normocephalic  EYES: EOMI, PERRLA, conjunctiva and sclera clear  ENMT: Moist mucous membranes, lip laceration with scabbing  NECK: Supple   NERVOUS SYSTEM:  Alert & Oriented X3, Motor Strength 5/5 B/L upper and lower extremities   CHEST/LUNG: Clear to auscultation bilaterally; No rales, rhonchi, wheezing, or rubs  HEART: Regular rate and rhythm; + S1/S2  ABDOMEN: Soft, Nontender, Nondistended; Bowel sounds present  EXTREMITIES:  no pedal edema

## 2023-09-22 NOTE — PROGRESS NOTE ADULT - PROBLEM SELECTOR PLAN 1
.  - Patient's echocardiogram with apical LVH concerning for HCM.   - EP following. Plan for ILR today  - No arrhythmias on telemetry.   - CCTA without calcium  - Cardiac MRI: apical left ventricular hypertrophy in spade-like pattern, recommend cardiac MRI to confirm apical HCM. No evidence of apical aneurysm or thrombus with intravenous echocontrast.  - Continue telemetry monitoring.  - start Toprol 25mg PO daily
- Patient's echocardiogram with apical LVH concerning for HCM.   - EP following.   - No arrhythmias on telemetry.   - CTA cardiac pending to evaluate for CAD.   - Cardiac MRI pending to evaluate for scar.  - EP following.   - Continue telemetry monitoring.

## 2023-09-22 NOTE — PROGRESS NOTE ADULT - SUBJECTIVE AND OBJECTIVE BOX
[facility]                                                         CARDIOLOGY AT [place] [address]                                                         Telephone: [phone]. Fax:[phone]                                                                             PROGRESS NOTE    Reason for follow up:   Update:       Review of symptoms:   Cardiac:  No chest pain. No dyspnea. No palpitations.  Respiratory: no cough. No dyspnea  Gastrointestinal: No diarrhea. No abdominal pain. No bleeding.   Neuro: No focal neuro complaints.    Vitals:  T(C): 36.4 (09-22-23 @ 07:49), Max: 37.1 (09-21-23 @ 16:50)  HR: 62 (09-22-23 @ 07:49) (62 - 79)  BP: 104/73 (09-22-23 @ 07:49) (103/70 - 113/76)  RR: 18 (09-22-23 @ 07:49) (16 - 20)  SpO2: 99% (09-22-23 @ 07:49) (97% - 99%)  Wt(kg): --  I&O's Summary    21 Sep 2023 07:01  -  22 Sep 2023 07:00  --------------------------------------------------------  IN: 240 mL / OUT: 0 mL / NET: 240 mL      Weight (kg): 71 (09-20 @ 03:38)    PHYSICAL EXAM:  Appearance: Comfortable. No acute distress  HEENT:  Atraumatic. Normocephalic.  Normal oral mucosa  Neurologic: A & O x 3, no gross focal deficits.  Cardiovascular: RRR S1 S2, No murmur, no rubs/gallops. No JVD  Respiratory: Lungs clear to auscultation, unlabored   Gastrointestinal:  Soft, Non-tender, + BS  Lower Extremities: 2+ Peripheral Pulses, No clubbing, cyanosis, or edema  Psychiatry: Patient is calm. No agitation.   Skin: warm and dry.    CURRENT CARDIAC MEDICATIONS:      CURRENT OTHER MEDICATIONS:  ceFAZolin  Injectable. 2000 milliGRAM(s) IV Push once  acetaminophen     Tablet .. 650 milliGRAM(s) Oral every 6 hours PRN Temp greater or equal to 38C (100.4F), Mild Pain (1 - 3)  ALPRAZolam 0.25 milliGRAM(s) Oral every 8 hours PRN anxiety  oxyCODONE    IR 2.5 milliGRAM(s) Oral every 4 hours PRN Moderate Pain (4 - 6)  oxyCODONE    IR 5 milliGRAM(s) Oral every 4 hours PRN Severe Pain (7 - 10)  polyethylene glycol 3350 17 Gram(s) Oral daily  senna 2 Tablet(s) Oral at bedtime  influenza   Vaccine 0.5 milliLiter(s) IntraMuscular once      LABS:	 	  ( 20 Sep 2023 09:43 )  Troponin T  <0.01,  CPK  X    , CKMB  X    , BNP X        , ( 20 Sep 2023 02:27 )  Troponin T  <0.01,  CPK  X    , CKMB  X    , BNP X                                  15.8   10.32 )-----------( 181      ( 22 Sep 2023 05:33 )             46.4     09-22    136  |  100  |  11.3  ----------------------------<  116<H>  4.4   |  26.0  |  0.82    Ca    9.5      22 Sep 2023 05:33  Phos  2.9     09-22  Mg     2.3     09-22        Lipid Profile:   HgA1c:   TSH:     TELEMETRY:   ECG:    DIAGNOSTIC TESTING:  [ ] Echocardiogram:   < from: TTE Echo Complete w/ Contrast w/ Doppler (09.20.23 @ 08:25) >  PHYSICIAN INTERPRETATION:  Left Ventricle: The left ventricular internal cavity size is normal.  Global LV systolic function was normal. Left ventricular ejection   fraction, by visual estimation, is 60 to 65%. Spectral Doppler shows   normal pattern of LV diastolic filling.  Right Ventricle: Normal right ventricular size and function.  Pericardium: There is no evidence of pericardial effusion.  Mitral Valve: Structurally normal mitral valve, with normal leaflet   excursion.  Tricuspid Valve: Structurally normal tricuspid valve, with normal leaflet   excursion.  Aortic Valve: The aortic valve is trileaflet. No evidence of aortic   stenosis. No evidence of aortic valve regurgitation is seen.  Pulmonic Valve: The pulmonic valve was not well visualized.  Aorta: The aortic root and ascending aorta are structurally normal, with   no evidence of dilitation.  Pulmonary Artery: The pulmonary artery is not well seen.  Venous: The inferior vena cava is not well visualized.  In comparison to the previous echocardiogram(s): There are no prior   studies on this patient for comparison purposes.      Summary:   1. Left ventricular ejection fraction, by visual estimation, is 60 to   65%.   2. Normal global left ventricular systolic function.   3. There is apical left ventricular hypertrophy in spade-like pattern,   recommend cardiac MRI to confirm apical HCM. No evidence of apical   aneurysm or thrombus with intravenous echocontrast.   4. Global longitudinal strain values reduced at the LV apex.   5. Normal right ventricular size and function, inadequate estimation of   RVSP.   6. There is no evidence of pericardial effusion.    Nasim Aguirre DO Electronically signed on 9/20/2023 at 10:05:12 A    < end of copied text >  [ ]  Catheterization:  [ ] Stress Test:    OTHER:

## 2023-09-22 NOTE — DISCHARGE NOTE NURSING/CASE MANAGEMENT/SOCIAL WORK - PATIENT PORTAL LINK FT
You can access the FollowMyHealth Patient Portal offered by [facility] by registering at the following website: [email or web address]. By joining [employer]’s FollowMyHealth portal, you will also be able to view your health information using other applications (apps) compatible with our system.

## 2023-09-22 NOTE — DISCHARGE NOTE NURSING/CASE MANAGEMENT/SOCIAL WORK - NSDCVIVACCINE_GEN_ALL_CORE_FT
Tdap; 20-Sep-2023 02:36; Bernardo Campuzano (RN); [employer]; [id number] (Exp. Date: 30-Oct-2025); IntraMuscular; Deltoid Right.; 0.5 milliLiter(s); VIS (VIS Published: 09-May-2013, VIS Presented: 20-Sep-2023);

## 2023-09-22 NOTE — DISCHARGE NOTE PROVIDER - HOSPITAL COURSE
Pt is a 47 y/o f, current smoker, with no known PMHx, who presented to [facility] earlier today c/o sudden syncope resulting in facial trauma. Pt states she has been suffering from a sinus infection for the 10 days  prior to admission and was recently prescribed a course of Augmentin. Pt states she stood up to  use the bathroom and after taking several steps she lost consciousness and struck her face on the floor. Pt reports similar episodes of syncope in the past (approximately 15-20 episodes) usually preceded by "stress". Seen in consultation by EP and General Cardiology. Pt reports feeling palpitations prior to the syncopal events and has attributed them to anxiety and thus never underwent a cardiac workup. TTE performed, revealed  left ventricular hypertrophy concerning for HCM. Cardiac MRI: [place]-shaped appearance of the left ventricle with systolic apical   cavity obliteration. The maximum wall thickness is 13 mm. While the wall thickness does not meet size criteria, apical HOCM remains on the   differential. Pt is a 49 y/o f, current smoker, with no known PMHx, who presented to [facility] earlier today c/o sudden syncope resulting in facial trauma. Pt states she has been suffering from a sinus infection for the 10 days  prior to admission and was recently prescribed a course of Augmentin. Pt states she stood up to  use the bathroom and after taking several steps she lost consciousness and struck her face on the floor. Pt reports similar episodes of syncope in the past (approximately 15-20 episodes) usually preceded by "stress". Seen in consultation by EP and General Cardiology. Pt reports feeling palpitations prior to the syncopal events and has attributed them to anxiety and thus never underwent a cardiac workup. TTE performed, revealed  left ventricular hypertrophy concerning for HCM. Cardiac MRI: [place]-shaped appearance of the left ventricle with systolic apical   cavity obliteration. The maximum wall thickness is 13 mm. While the wall thickness does not meet size criteria, apical HOCM remains on the differential, however given unclear HOCM diagnosed and syncopal episodes clinically consistent with orthostasis will defer ICD implant at this time and ILR implanted on 9/22 for continuing monitoring and to correlate with symptoms. Outpatient Dental evaluation recommended. The patient is medically stable for discharge.

## 2023-09-22 NOTE — PROGRESS NOTE ADULT - ASSESSMENT
47 y/o female with no known PMHx, who presented to [facility] s/p syncope resulting in facial trauma. Pt states she has been suffering from a sinus infection for the past 10 days and was recently prescribed a course of Augmentin. Pt states she stood up to  use the bathroom and after taking several steps she lost consciousness and struck her face on the floor. Pt reports similar episodes of syncope in the past (approximately 15-20 episodes) all preceding with standing and/or walking. Patient does report a family history for SDC- "heart attack grandfather at age 35". TTE revealed left ventricular hypertrophy concerning for HCM. CMR revealed a "[place]-shaped appearance of the left ventricle with systolic apical cavity obliteration, maximum wall thickness is 13 mm. While the wall thickness does not meet size criteria, apical HOCM remains on the differential. Hyperdynamic left ventricle (ejection fraction=74%). No evidence of myocardial scar". Results discussed with patient and given unclear HOCM diagnosed and syncopal episodes clinically consistent with orthostasis will defer ICD implant at this time and implant an ILR for continuing monitoring and to correlate with symptoms.      47 y/o female with no known PMHx, who presented to [facility] s/p syncope resulting in facial trauma. Pt states she has been suffering from a sinus infection for the past 10 days and was recently prescribed a course of Augmentin. Pt states she stood up to  use the bathroom and after taking several steps she lost consciousness and struck her face on the floor. Pt reports similar episodes of syncope in the past (approximately 15-20 episodes) all preceding with standing and/or walking. Patient does report a family history for SCD- "heart attack grandfather at age 35". TTE revealed left ventricular hypertrophy concerning for HCM. A CMR was performed and revealed a "[place]-shaped appearance of the left ventricle with systolic apical cavity obliteration, maximum wall thickness is 13 mm. While the wall thickness does not meet size criteria, apical HOCM remains on the differential. Hyperdynamic left ventricle (ejection fraction=74%). No evidence of myocardial scar". Results discussed with patient and given unclear HOCM diagnosed and syncopal episodes clinically consistent with orthostasis will defer ICD implant at this time and implant an ILR for continuing monitoring and to correlate with symptoms.     Plan:  - ILR implant as above  - Outpatient follow up with Dr. Barclay   - Will consider genetic testing in the out patient setting  - Full recommendations to follow      49 y/o female with no known PMHx, who presented to [facility] s/p syncope resulting in facial trauma. Pt states she has been suffering from a sinus infection for the past 10 days and was recently prescribed a course of Augmentin. Pt states she stood up to  use the bathroom and after taking several steps she lost consciousness and struck her face on the floor. Pt reports similar episodes of syncope in the past (approximately 15-20 episodes) all preceding with standing and/or walking. Denies family history for SCD but reports "heart attack grandfather at age 35". TTE revealed left ventricular hypertrophy concerning for HCM. A CMR was performed and revealed a "[place]-shaped appearance of the left ventricle with systolic apical cavity obliteration, maximum wall thickness is 13 mm. While the wall thickness does not meet size criteria, apical HOCM remains on the differential. Hyperdynamic left ventricle (ejection fraction=74%). No evidence of myocardial scar". Results discussed with patient and given unclear HOCM diagnosed and syncopal episodes clinically consistent with orthostasis will defer ICD implant at this time and implant an ILR for continuing monitoring and to correlate with symptoms.     Plan:  - ILR implant as above  - Outpatient follow up with Dr. Barclay   - Will consider genetic testing in the out patient setting  - Full recommendations to follow      49 y/o female with no known PMHx, who presented to [facility] s/p syncope resulting in facial trauma. Pt states she has been suffering from a sinus infection for the past 10 days and was recently prescribed a course of Augmentin. Pt states she stood up to  use the bathroom and after taking several steps she lost consciousness and struck her face on the floor. Pt reports similar episodes of syncope in the past (approximately 15-20 episodes) all preceding with standing and/or walking. Denies family history for SCD but reports "heart attack grandfather at age 35". TTE revealed left ventricular hypertrophy concerning for HCM. A CMR was performed and revealed a "[place]-shaped appearance of the left ventricle with systolic apical cavity obliteration, maximum wall thickness is 13 mm. While the wall thickness does not meet size criteria, apical HOCM remains on the differential. Hyperdynamic left ventricle (ejection fraction=74%). No evidence of myocardial scar". Results discussed with patient and given unclear HOCM diagnosed and syncopal episodes clinically consistent with orthostasis will defer ICD implant at this time and implant an ILR for continuing monitoring and to correlate with symptoms.     Plan:  - ILR implant as above  - Outpatient follow up with Dr. Barclay   - Genetic testing in the outpatient setting  - Full recommendations to follow

## 2023-09-22 NOTE — DISCHARGE NOTE PROVIDER - NSDCACTIVITY_GEN_ALL_CORE
No heavy lifting/straining/Follow Instructions Provided by your Surgical Team Do not drive or operate machinery/No heavy lifting/straining/Follow Instructions Provided by your Surgical Team

## 2023-09-25 PROBLEM — Z00.00 ENCOUNTER FOR PREVENTIVE HEALTH EXAMINATION: Status: ACTIVE | Noted: 2023-09-25

## 2023-09-29 ENCOUNTER — EMERGENCY (EMERGENCY)
Facility: HOSPITAL | Age: 48
LOS: 1 days | Discharge: HOME | End: 2023-09-29
Attending: EMERGENCY MEDICINE
Payer: COMMERCIAL

## 2023-09-29 VITALS
DIASTOLIC BLOOD PRESSURE: 64 MMHG | HEART RATE: 121 BPM | WEIGHT: 160.06 LBS | OXYGEN SATURATION: 98 % | TEMPERATURE: 98 F | HEIGHT: 63 IN | RESPIRATION RATE: 18 BRPM | SYSTOLIC BLOOD PRESSURE: 114 MMHG

## 2023-09-29 VITALS — HEART RATE: 96 BPM

## 2023-09-29 PROCEDURE — 99283 EMERGENCY DEPT VISIT LOW MDM: CPT

## 2023-09-29 PROCEDURE — 99284 EMERGENCY DEPT VISIT MOD MDM: CPT

## 2023-09-29 PROCEDURE — 93005 ELECTROCARDIOGRAM TRACING: CPT

## 2023-09-29 PROCEDURE — 93010 ELECTROCARDIOGRAM REPORT: CPT

## 2023-09-29 PROCEDURE — G0463: CPT

## 2023-09-29 RX ORDER — ALPRAZOLAM 0.25 MG
1 TABLET ORAL
Qty: 9 | Refills: 0
Start: 2023-09-29 | End: 2023-10-01

## 2023-09-29 NOTE — ED PROVIDER NOTE - PATIENT PORTAL LINK FT
You can access the FollowMyHealth Patient Portal offered by [facility] by registering at the following website: [email or web address]. By joining [employer]’s FollowMyHealth portal, you will also be able to view your health information using other applications (apps) compatible with our system.

## 2023-09-29 NOTE — ED PROVIDER NOTE - PROGRESS NOTE DETAILS
ISTOP    A	N	N	O	09/22/2023	09/22/2023	oxycodone hcl (ir) 5 mg tablet	18	3	Josey Lugo	[id number]	[facility]  A	N	N	B	09/22/2023	09/22/2023	alprazolam 0.25 mg tablet	9	3	Josey Lugo	[id number]	[facility]

## 2023-09-29 NOTE — ED PROVIDER NOTE - ATTENDING APP SHARED VISIT CONTRIBUTION OF CARE
I, Rommel Kelly, have personally performed a face to face diagnostic evaluation on this patient. I have reviewed the OUM[facility] note and agree with the history, exam and plan of care, except as noted.    48-year-old female presents with wound check of the lower lip.  Patient was  recently admitted for syncope.  Patient has sutures placed to her lower lip on September 20.  Patient had loop recorder placed prior to discharge.  Patient return with small amount of discharge from her lower lip and felt the sutures came undone.  Patient also reports severe anxiety since her discharge.  Patient was prescribed Xanax 0.25 for 3 days which she ran out.  Patient is seeing her PMD in 3 days.  On exam small gaping in the lower lip with small amount of white discharge.   Patient already on amoxicillin. EKG showed sinus at 96, T wave inversions unchanged from prior.  Patient prescribed Xanax 0.25 for additional 3 days until she can follow-up with her PMD.  Recommend to continue antibiotics.  And follow-up outpatient.

## 2023-09-29 NOTE — ED PROVIDER NOTE - OBJECTIVE STATEMENT
47 y/o F here for wound on lower lip.  Patient had stitches placed 6 days ago.  Denies any new trauma.  Patient is feeling anxious.  Denies chest pain, shortness of breath, suicidal ideation or homicidal ideation.

## 2023-10-05 ENCOUNTER — EMERGENCY (EMERGENCY)
Facility: HOSPITAL | Age: 48
LOS: 1 days | Discharge: HOME | End: 2023-10-05
Attending: STUDENT IN AN ORGANIZED HEALTH CARE EDUCATION/TRAINING PROGRAM
Payer: COMMERCIAL

## 2023-10-05 ENCOUNTER — APPOINTMENT (OUTPATIENT)
Dept: CARDIOLOGY | Facility: CLINIC | Age: 48
End: 2023-10-05

## 2023-10-05 VITALS
HEIGHT: 63 IN | HEART RATE: 84 BPM | TEMPERATURE: 98 F | SYSTOLIC BLOOD PRESSURE: 148 MMHG | RESPIRATION RATE: 18 BRPM | DIASTOLIC BLOOD PRESSURE: 78 MMHG | OXYGEN SATURATION: 98 %

## 2023-10-05 PROCEDURE — 99283 EMERGENCY DEPT VISIT LOW MDM: CPT

## 2023-10-05 RX ORDER — IBUPROFEN 200 MG
600 TABLET ORAL ONCE
Refills: 0 | Status: COMPLETED | OUTPATIENT
Start: 2023-10-05 | End: 2023-10-05

## 2023-10-05 RX ADMIN — Medication 600 MILLIGRAM(S): at 06:40

## 2023-10-05 NOTE — ED PROVIDER NOTE - CARE PROVIDER_API CALL
Marc Esteban  Plastic Surgery  [address] [place] [id number]  Phone: [phone]  Fax: [phone]  Follow Up Time:

## 2023-10-05 NOTE — ED ADULT TRIAGE NOTE - CHIEF COMPLAINT QUOTE
BIBEMS c/o lip lesion to bottom lip.  pt states she had a fall on 9/20 and received sutures to her lips and lower lip.  pt followed up with her PCP who is concerned for basal cell carcinoma on her lip.  pt denies difficulty breathing, sob, trouble swallowing, bleeding, discharge.

## 2023-10-05 NOTE — ED PROVIDER NOTE - PHYSICAL EXAMINATION
General: non-toxic appearing, in no acute distress, A+Ox3  HENT: mildly swollen and healing bottom lip mucosa. no oral or lip lesions. Mucous membranes moist, no gingival inflammation, no tonsillar hypertrophy or exudates, uvula midline. Neck supple without bruits or adenopathy   CV: RRR, nl s1/s2.  Resp: CTAB, normal rate and effort

## 2023-10-05 NOTE — ED ADULT NURSE NOTE - OBJECTIVE STATEMENT
pt A & O x4 for wound check on lower lip after suture repair 2-3 weeks ago. Pt reports pain and swelling to lip. Airway patent. Respirations even and unlabored. No chest pain or SOB.

## 2023-10-05 NOTE — ED ADULT NURSE NOTE - NSFALLUNIVINTERV_ED_ALL_ED
Bed/Stretcher in lowest position, wheels locked, appropriate side rails in place/Call bell, personal items and telephone in reach/Instruct patient to call for assistance before getting out of bed/chair/stretcher/Non-slip footwear applied when patient is off stretcher/[place] to call system/Physically safe environment - no spills, clutter or unnecessary equipment/Purposeful proactive rounding/Room/bathroom lighting operational, light cord in reach

## 2023-10-05 NOTE — ED PROVIDER NOTE - CLINICAL SUMMARY MEDICAL DECISION MAKING FREE TEXT BOX
47yo F p/w swelling of lip. on eval, well appearing with edema to bottom lip and flaking/healing from laceration. VSS. ordered IBU for swelling. removed 3 nonabsorbable sutures form chin and 6 from knee. no signs of infection of suture sites. pt reports having f/u wih derm. referred to plastics and derm. discussed supportive care measures and return precautions. pt verbalized understanding and agreement

## 2023-10-05 NOTE — ED PROVIDER NOTE - NSFOLLOWUPCLINICS_GEN_ALL_ED_FT
[facility] - [facility]  [address] [place] [id number]  Phone: [phone]  Fax: [phone]

## 2023-10-05 NOTE — ED PROVIDER NOTE - OBJECTIVE STATEMENT
47yo F presents to ED c/o swollen lip x2w. pt was seen in ED 2w ago for fall. at that time, had lac repaired to lip, knee and chin. Rx and finished abx as directed. shortly after lac repair pt reports having large black scab over lip. went to derm and was advised that it might be cancerous, scheduled to do a biospy. pt reports last night scab fell off and swelling increased. pt concerned about swelling and requesting to see derm/oncologist in ED. has not taken otc antiinflammatory and pain meds. denies pain, fever, drainage, CP, SOB, abdominal pain, NVD, CP

## 2023-10-05 NOTE — ED PROVIDER NOTE - ATTENDING APP SHARED VISIT CONTRIBUTION OF CARE
48y F w/ no significant PMH presents for lip pain. Pt sustained lacerations to lower lip, chin and left knee on 9/20. Had lacerations repaired in this ED. Since then developed large black lesion over lip. Says she saw dermatology and was told that it could be cancerous; is scheduled for biopsy. Says that the lip lesion fell off last night. No fever. On exam, pt with healing, sutured lacerations to lower inner lip, chin and left knee. No purulent drainage or other signs of cellulitis, no active bleeding. Nonabsorbable sutures removed from chin/lip by PA (lip laceration was closed with monocryl). Given referral to facial/plastic surgeon. Medically stable for discharge.

## 2023-10-05 NOTE — ED PROVIDER NOTE - PATIENT PORTAL LINK FT
You can access the FollowMyHealth Patient Portal offered by [facility] by registering at the following website: [email or web address]. By joining [facility]’s FollowMyHealth portal, you will also be able to view your health information using other applications (apps) compatible with our system.

## 2023-10-12 ENCOUNTER — APPOINTMENT (OUTPATIENT)
Dept: CARDIOLOGY | Facility: CLINIC | Age: 48
End: 2023-10-12
Payer: COMMERCIAL

## 2023-10-12 ENCOUNTER — NON-APPOINTMENT (OUTPATIENT)
Age: 48
End: 2023-10-12

## 2023-10-12 VITALS
TEMPERATURE: 98.5 F | SYSTOLIC BLOOD PRESSURE: 126 MMHG | OXYGEN SATURATION: 95 % | BODY MASS INDEX: 27.29 KG/M2 | HEIGHT: 63 IN | DIASTOLIC BLOOD PRESSURE: 78 MMHG | HEART RATE: 82 BPM | WEIGHT: 154 LBS

## 2023-10-12 VITALS — SYSTOLIC BLOOD PRESSURE: 124 MMHG | DIASTOLIC BLOOD PRESSURE: 78 MMHG

## 2023-10-12 PROCEDURE — 93000 ELECTROCARDIOGRAM COMPLETE: CPT

## 2023-10-12 PROCEDURE — 99214 OFFICE O/P EST MOD 30 MIN: CPT | Mod: 25

## 2023-10-12 RX ORDER — METOPROLOL SUCCINATE 25 MG/1
25 TABLET, EXTENDED RELEASE ORAL
Qty: 90 | Refills: 3 | Status: ACTIVE | COMMUNITY
Start: 1900-01-01 | End: 1900-01-01

## 2023-10-20 ENCOUNTER — NON-APPOINTMENT (OUTPATIENT)
Age: 48
End: 2023-10-20

## 2023-10-20 ENCOUNTER — APPOINTMENT (OUTPATIENT)
Dept: ELECTROPHYSIOLOGY | Facility: CLINIC | Age: 48
End: 2023-10-20
Payer: COMMERCIAL

## 2023-10-20 VITALS
BODY MASS INDEX: 27.11 KG/M2 | WEIGHT: 153 LBS | OXYGEN SATURATION: 97 % | DIASTOLIC BLOOD PRESSURE: 80 MMHG | HEART RATE: 93 BPM | SYSTOLIC BLOOD PRESSURE: 122 MMHG | HEIGHT: 63 IN

## 2023-10-20 DIAGNOSIS — I42.2 OTHER HYPERTROPHIC CARDIOMYOPATHY: ICD-10-CM

## 2023-10-20 DIAGNOSIS — R55 SYNCOPE AND COLLAPSE: ICD-10-CM

## 2023-10-20 DIAGNOSIS — Z95.818 PRESENCE OF OTHER CARDIAC IMPLANTS AND GRAFTS: ICD-10-CM

## 2023-10-20 PROCEDURE — 93000 ELECTROCARDIOGRAM COMPLETE: CPT

## 2023-10-20 PROCEDURE — 99214 OFFICE O/P EST MOD 30 MIN: CPT | Mod: 25

## 2023-10-20 RX ORDER — ALPRAZOLAM 0.25 MG/1
0.25 TABLET ORAL
Refills: 0 | Status: DISCONTINUED | COMMUNITY
End: 2023-10-20

## 2023-10-20 RX ORDER — OXYCODONE 5 MG/1
5 TABLET ORAL
Refills: 0 | Status: DISCONTINUED | COMMUNITY
End: 2023-10-20

## 2023-10-24 ENCOUNTER — EMERGENCY (EMERGENCY)
Facility: HOSPITAL | Age: 48
LOS: 1 days | Discharge: HOME | End: 2023-10-24
Attending: EMERGENCY MEDICINE
Payer: COMMERCIAL

## 2023-10-24 VITALS
WEIGHT: 149.91 LBS | TEMPERATURE: 99 F | OXYGEN SATURATION: 98 % | SYSTOLIC BLOOD PRESSURE: 143 MMHG | HEART RATE: 103 BPM | DIASTOLIC BLOOD PRESSURE: 84 MMHG | HEIGHT: 63 IN | RESPIRATION RATE: 20 BRPM

## 2023-10-24 PROCEDURE — 99283 EMERGENCY DEPT VISIT LOW MDM: CPT

## 2023-10-24 PROCEDURE — 99284 EMERGENCY DEPT VISIT MOD MDM: CPT

## 2023-10-24 RX ADMIN — Medication 1 TABLET(S): at 21:00

## 2023-10-24 NOTE — ED PROVIDER NOTE - PHYSICAL EXAMINATION
Physical Examination:   Gen: NAD, AOx3  Head: NCAT  HEENT: EOMI, oral mucosa moist, normal conjunctiva, neck supple no dental abscess, poor dentition.   Lung: no respiratory distress  CV:  Normal perfusion  Abd: soft, NT ND  MSK: No edema, no visible deformities  Neuro: No focal neurologic deficits  Skin: No rash   Psych: normal affect

## 2023-10-24 NOTE — ED PROVIDER NOTE - PATIENT PORTAL LINK FT
You can access the FollowMyHealth Patient Portal offered by [facility] by registering at the following website: [email or web address]. By joining [facility]’s FollowMyHealth portal, you will also be able to view your health information using other applications (apps) compatible with our system.

## 2023-10-24 NOTE — ED PROVIDER NOTE - OBJECTIVE STATEMENT
48 year old female with pmhx of HOCM, presented to ED c/o dental pain. pt states she has left lower molar pain x 1 week. she has been following with an oral surgeon. she needs root canals to have dentures placed. she is upset because her dental insurance will not full cover the procedure and she cannot afford it out of pocket. also states she is upset she has to uber everywhere. denies chest pain, sob, fever/chills, n/v/d, abd pain, SI/HI.

## 2023-10-24 NOTE — ED PROVIDER NOTE - NSFOLLOWUPINSTRUCTIONS_ED_ALL_ED_FT
Take antibiotics as instructed   Take motrin every 6 hours for pain   FOllow up with dental clinic within 1-2 days     Return if new or worsening symptoms     Dental Pain    Dental pain (toothache) may be caused by many things including tooth decay (cavities or caries), abscess or infection, or trauma. If you were prescribed an antibiotic medicine, finish all of it even if you start to feel better. Rinsing your mouth with salt water or applying ice to the painful area of your face may help with the pain. Follow up with a dentist is important in ensuring good oral health and preventing the worsening of dental disease.    SEEK IMMEDIATE MEDICAL CARE IF YOU HAVE ANY OF THE FOLLOWING SYMPTOMS: unable to open your mouth, trouble breathing or swallowing, fever, or swelling of the face, neck, or jaw.

## 2023-10-24 NOTE — ED ADULT NURSE NOTE - OBJECTIVE STATEMENT
c/o toothache. Pt medicated with motrin PTA without relief. Pt AOx4, speaking coherently, respirations even and unlabored on RA, skin warm and dry.

## 2023-10-24 NOTE — CHART NOTE - NSCHARTNOTEFT_GEN_A_CORE
SWNote: p/c from pt's ED PA (Rocio) requesting bus tickets for pt (1ticket 1 transfer given) no other SW needs reported.

## 2023-10-24 NOTE — ED ADULT TRIAGE NOTE - CHIEF COMPLAINT QUOTE
pt BIBEMS for tooth pain states she had a oral surgery remove and replacement several teeth after having a fall. pt states she last took moltrin around 5pm this evening with no relief pain currently 8/10

## 2023-10-30 ENCOUNTER — EMERGENCY (EMERGENCY)
Facility: HOSPITAL | Age: 48
LOS: 1 days | Discharge: PSYCHIATRIC OR SUBSTANCE USE FACILITY | End: 2023-10-30
Attending: EMERGENCY MEDICINE | Admitting: EMERGENCY MEDICINE
Payer: COMMERCIAL

## 2023-10-30 VITALS
HEIGHT: 63 IN | HEART RATE: 104 BPM | RESPIRATION RATE: 20 BRPM | TEMPERATURE: 98 F | WEIGHT: 153 LBS | DIASTOLIC BLOOD PRESSURE: 86 MMHG | SYSTOLIC BLOOD PRESSURE: 142 MMHG | OXYGEN SATURATION: 98 %

## 2023-10-30 DIAGNOSIS — F17.200 NICOTINE DEPENDENCE, UNSPECIFIED, UNCOMPLICATED: ICD-10-CM

## 2023-10-30 DIAGNOSIS — F43.22 ADJUSTMENT DISORDER WITH ANXIETY: ICD-10-CM

## 2023-10-30 DIAGNOSIS — Z98.890 OTHER SPECIFIED POSTPROCEDURAL STATES: Chronic | ICD-10-CM

## 2023-10-30 LAB
ALBUMIN SERPL ELPH-MCNC: 4.2 G/DL — SIGNIFICANT CHANGE UP (ref 3.3–5)
ALBUMIN SERPL ELPH-MCNC: 4.2 G/DL — SIGNIFICANT CHANGE UP (ref 3.3–5)
ALP SERPL-CCNC: 46 U/L — SIGNIFICANT CHANGE UP (ref 30–120)
ALP SERPL-CCNC: 46 U/L — SIGNIFICANT CHANGE UP (ref 30–120)
ALT FLD-CCNC: 19 U/L — SIGNIFICANT CHANGE UP (ref 10–60)
ALT FLD-CCNC: 19 U/L — SIGNIFICANT CHANGE UP (ref 10–60)
AMPHET UR-MCNC: NEGATIVE — SIGNIFICANT CHANGE UP
AMPHET UR-MCNC: NEGATIVE — SIGNIFICANT CHANGE UP
ANION GAP SERPL CALC-SCNC: 12 MMOL/L — SIGNIFICANT CHANGE UP (ref 5–17)
ANION GAP SERPL CALC-SCNC: 12 MMOL/L — SIGNIFICANT CHANGE UP (ref 5–17)
APAP SERPL-MCNC: 22 UG/ML — SIGNIFICANT CHANGE UP (ref 10–30)
APAP SERPL-MCNC: 22 UG/ML — SIGNIFICANT CHANGE UP (ref 10–30)
APPEARANCE UR: CLEAR — SIGNIFICANT CHANGE UP
APPEARANCE UR: CLEAR — SIGNIFICANT CHANGE UP
AST SERPL-CCNC: 21 U/L — SIGNIFICANT CHANGE UP (ref 10–40)
AST SERPL-CCNC: 21 U/L — SIGNIFICANT CHANGE UP (ref 10–40)
BARBITURATES UR SCN-MCNC: NEGATIVE — SIGNIFICANT CHANGE UP
BARBITURATES UR SCN-MCNC: NEGATIVE — SIGNIFICANT CHANGE UP
BASOPHILS # BLD AUTO: 0.03 K/UL — SIGNIFICANT CHANGE UP (ref 0–0.2)
BASOPHILS # BLD AUTO: 0.03 K/UL — SIGNIFICANT CHANGE UP (ref 0–0.2)
BASOPHILS NFR BLD AUTO: 0.3 % — SIGNIFICANT CHANGE UP (ref 0–2)
BASOPHILS NFR BLD AUTO: 0.3 % — SIGNIFICANT CHANGE UP (ref 0–2)
BENZODIAZ UR-MCNC: NEGATIVE — SIGNIFICANT CHANGE UP
BENZODIAZ UR-MCNC: NEGATIVE — SIGNIFICANT CHANGE UP
BILIRUB SERPL-MCNC: 0.6 MG/DL — SIGNIFICANT CHANGE UP (ref 0.2–1.2)
BILIRUB SERPL-MCNC: 0.6 MG/DL — SIGNIFICANT CHANGE UP (ref 0.2–1.2)
BILIRUB UR-MCNC: NEGATIVE — SIGNIFICANT CHANGE UP
BILIRUB UR-MCNC: NEGATIVE — SIGNIFICANT CHANGE UP
BUN SERPL-MCNC: 12 MG/DL — SIGNIFICANT CHANGE UP (ref 7–23)
BUN SERPL-MCNC: 12 MG/DL — SIGNIFICANT CHANGE UP (ref 7–23)
CALCIUM SERPL-MCNC: 10.3 MG/DL — SIGNIFICANT CHANGE UP (ref 8.4–10.5)
CALCIUM SERPL-MCNC: 10.3 MG/DL — SIGNIFICANT CHANGE UP (ref 8.4–10.5)
CHLORIDE SERPL-SCNC: 98 MMOL/L — SIGNIFICANT CHANGE UP (ref 96–108)
CHLORIDE SERPL-SCNC: 98 MMOL/L — SIGNIFICANT CHANGE UP (ref 96–108)
CO2 SERPL-SCNC: 24 MMOL/L — SIGNIFICANT CHANGE UP (ref 22–31)
CO2 SERPL-SCNC: 24 MMOL/L — SIGNIFICANT CHANGE UP (ref 22–31)
COCAINE METAB.OTHER UR-MCNC: NEGATIVE — SIGNIFICANT CHANGE UP
COCAINE METAB.OTHER UR-MCNC: NEGATIVE — SIGNIFICANT CHANGE UP
COLOR SPEC: YELLOW — SIGNIFICANT CHANGE UP
COLOR SPEC: YELLOW — SIGNIFICANT CHANGE UP
CREAT SERPL-MCNC: 1.14 MG/DL — SIGNIFICANT CHANGE UP (ref 0.5–1.3)
CREAT SERPL-MCNC: 1.14 MG/DL — SIGNIFICANT CHANGE UP (ref 0.5–1.3)
DIFF PNL FLD: NEGATIVE — SIGNIFICANT CHANGE UP
DIFF PNL FLD: NEGATIVE — SIGNIFICANT CHANGE UP
EGFR: 59 ML/MIN/1.73M2 — LOW
EGFR: 59 ML/MIN/1.73M2 — LOW
EOSINOPHIL # BLD AUTO: 0.02 K/UL — SIGNIFICANT CHANGE UP (ref 0–0.5)
EOSINOPHIL # BLD AUTO: 0.02 K/UL — SIGNIFICANT CHANGE UP (ref 0–0.5)
EOSINOPHIL NFR BLD AUTO: 0.2 % — SIGNIFICANT CHANGE UP (ref 0–6)
EOSINOPHIL NFR BLD AUTO: 0.2 % — SIGNIFICANT CHANGE UP (ref 0–6)
ETHANOL SERPL-MCNC: <3 MG/DL — SIGNIFICANT CHANGE UP (ref 0–3)
ETHANOL SERPL-MCNC: <3 MG/DL — SIGNIFICANT CHANGE UP (ref 0–3)
GLUCOSE SERPL-MCNC: 126 MG/DL — HIGH (ref 70–99)
GLUCOSE SERPL-MCNC: 126 MG/DL — HIGH (ref 70–99)
GLUCOSE UR QL: NEGATIVE MG/DL — SIGNIFICANT CHANGE UP
GLUCOSE UR QL: NEGATIVE MG/DL — SIGNIFICANT CHANGE UP
HCG SERPL-ACNC: <1 MIU/ML — SIGNIFICANT CHANGE UP
HCG SERPL-ACNC: <1 MIU/ML — SIGNIFICANT CHANGE UP
HCT VFR BLD CALC: 44.3 % — SIGNIFICANT CHANGE UP (ref 34.5–45)
HCT VFR BLD CALC: 44.3 % — SIGNIFICANT CHANGE UP (ref 34.5–45)
HGB BLD-MCNC: 14.9 G/DL — SIGNIFICANT CHANGE UP (ref 11.5–15.5)
HGB BLD-MCNC: 14.9 G/DL — SIGNIFICANT CHANGE UP (ref 11.5–15.5)
IMM GRANULOCYTES NFR BLD AUTO: 0.3 % — SIGNIFICANT CHANGE UP (ref 0–0.9)
IMM GRANULOCYTES NFR BLD AUTO: 0.3 % — SIGNIFICANT CHANGE UP (ref 0–0.9)
KETONES UR-MCNC: 15 MG/DL
KETONES UR-MCNC: 15 MG/DL
LEUKOCYTE ESTERASE UR-ACNC: NEGATIVE — SIGNIFICANT CHANGE UP
LEUKOCYTE ESTERASE UR-ACNC: NEGATIVE — SIGNIFICANT CHANGE UP
LYMPHOCYTES # BLD AUTO: 1.55 K/UL — SIGNIFICANT CHANGE UP (ref 1–3.3)
LYMPHOCYTES # BLD AUTO: 1.55 K/UL — SIGNIFICANT CHANGE UP (ref 1–3.3)
LYMPHOCYTES # BLD AUTO: 14.7 % — SIGNIFICANT CHANGE UP (ref 13–44)
LYMPHOCYTES # BLD AUTO: 14.7 % — SIGNIFICANT CHANGE UP (ref 13–44)
MCHC RBC-ENTMCNC: 30.8 PG — SIGNIFICANT CHANGE UP (ref 27–34)
MCHC RBC-ENTMCNC: 30.8 PG — SIGNIFICANT CHANGE UP (ref 27–34)
MCHC RBC-ENTMCNC: 33.6 GM/DL — SIGNIFICANT CHANGE UP (ref 32–36)
MCHC RBC-ENTMCNC: 33.6 GM/DL — SIGNIFICANT CHANGE UP (ref 32–36)
MCV RBC AUTO: 91.5 FL — SIGNIFICANT CHANGE UP (ref 80–100)
MCV RBC AUTO: 91.5 FL — SIGNIFICANT CHANGE UP (ref 80–100)
METHADONE UR-MCNC: NEGATIVE — SIGNIFICANT CHANGE UP
METHADONE UR-MCNC: NEGATIVE — SIGNIFICANT CHANGE UP
MONOCYTES # BLD AUTO: 0.51 K/UL — SIGNIFICANT CHANGE UP (ref 0–0.9)
MONOCYTES # BLD AUTO: 0.51 K/UL — SIGNIFICANT CHANGE UP (ref 0–0.9)
MONOCYTES NFR BLD AUTO: 4.9 % — SIGNIFICANT CHANGE UP (ref 2–14)
MONOCYTES NFR BLD AUTO: 4.9 % — SIGNIFICANT CHANGE UP (ref 2–14)
NEUTROPHILS # BLD AUTO: 8.37 K/UL — HIGH (ref 1.8–7.4)
NEUTROPHILS # BLD AUTO: 8.37 K/UL — HIGH (ref 1.8–7.4)
NEUTROPHILS NFR BLD AUTO: 79.6 % — HIGH (ref 43–77)
NEUTROPHILS NFR BLD AUTO: 79.6 % — HIGH (ref 43–77)
NITRITE UR-MCNC: NEGATIVE — SIGNIFICANT CHANGE UP
NITRITE UR-MCNC: NEGATIVE — SIGNIFICANT CHANGE UP
NRBC # BLD: 0 /100 WBCS — SIGNIFICANT CHANGE UP (ref 0–0)
NRBC # BLD: 0 /100 WBCS — SIGNIFICANT CHANGE UP (ref 0–0)
OPIATES UR-MCNC: NEGATIVE — SIGNIFICANT CHANGE UP
OPIATES UR-MCNC: NEGATIVE — SIGNIFICANT CHANGE UP
PCP SPEC-MCNC: SIGNIFICANT CHANGE UP
PCP SPEC-MCNC: SIGNIFICANT CHANGE UP
PCP UR-MCNC: NEGATIVE — SIGNIFICANT CHANGE UP
PCP UR-MCNC: NEGATIVE — SIGNIFICANT CHANGE UP
PH UR: 6 — SIGNIFICANT CHANGE UP (ref 5–8)
PH UR: 6 — SIGNIFICANT CHANGE UP (ref 5–8)
PLATELET # BLD AUTO: 259 K/UL — SIGNIFICANT CHANGE UP (ref 150–400)
PLATELET # BLD AUTO: 259 K/UL — SIGNIFICANT CHANGE UP (ref 150–400)
POTASSIUM SERPL-MCNC: 3.9 MMOL/L — SIGNIFICANT CHANGE UP (ref 3.5–5.3)
POTASSIUM SERPL-MCNC: 3.9 MMOL/L — SIGNIFICANT CHANGE UP (ref 3.5–5.3)
POTASSIUM SERPL-SCNC: 3.9 MMOL/L — SIGNIFICANT CHANGE UP (ref 3.5–5.3)
POTASSIUM SERPL-SCNC: 3.9 MMOL/L — SIGNIFICANT CHANGE UP (ref 3.5–5.3)
PROT SERPL-MCNC: 7.4 G/DL — SIGNIFICANT CHANGE UP (ref 6–8.3)
PROT SERPL-MCNC: 7.4 G/DL — SIGNIFICANT CHANGE UP (ref 6–8.3)
PROT UR-MCNC: NEGATIVE MG/DL — SIGNIFICANT CHANGE UP
PROT UR-MCNC: NEGATIVE MG/DL — SIGNIFICANT CHANGE UP
RBC # BLD: 4.84 M/UL — SIGNIFICANT CHANGE UP (ref 3.8–5.2)
RBC # BLD: 4.84 M/UL — SIGNIFICANT CHANGE UP (ref 3.8–5.2)
RBC # FLD: 12.8 % — SIGNIFICANT CHANGE UP (ref 10.3–14.5)
RBC # FLD: 12.8 % — SIGNIFICANT CHANGE UP (ref 10.3–14.5)
SALICYLATES SERPL-MCNC: 9.5 MG/DL — SIGNIFICANT CHANGE UP (ref 2.8–20)
SALICYLATES SERPL-MCNC: 9.5 MG/DL — SIGNIFICANT CHANGE UP (ref 2.8–20)
SARS-COV-2 RNA SPEC QL NAA+PROBE: SIGNIFICANT CHANGE UP
SARS-COV-2 RNA SPEC QL NAA+PROBE: SIGNIFICANT CHANGE UP
SODIUM SERPL-SCNC: 134 MMOL/L — LOW (ref 135–145)
SODIUM SERPL-SCNC: 134 MMOL/L — LOW (ref 135–145)
SP GR SPEC: 1.01 — SIGNIFICANT CHANGE UP (ref 1–1.03)
SP GR SPEC: 1.01 — SIGNIFICANT CHANGE UP (ref 1–1.03)
THC UR QL: NEGATIVE — SIGNIFICANT CHANGE UP
THC UR QL: NEGATIVE — SIGNIFICANT CHANGE UP
UROBILINOGEN FLD QL: 0.2 MG/DL — SIGNIFICANT CHANGE UP (ref 0.2–1)
UROBILINOGEN FLD QL: 0.2 MG/DL — SIGNIFICANT CHANGE UP (ref 0.2–1)
WBC # BLD: 10.51 K/UL — HIGH (ref 3.8–10.5)
WBC # BLD: 10.51 K/UL — HIGH (ref 3.8–10.5)
WBC # FLD AUTO: 10.51 K/UL — HIGH (ref 3.8–10.5)
WBC # FLD AUTO: 10.51 K/UL — HIGH (ref 3.8–10.5)

## 2023-10-30 PROCEDURE — 90792 PSYCH DIAG EVAL W/MED SRVCS: CPT | Mod: 95,GC

## 2023-10-30 PROCEDURE — 99285 EMERGENCY DEPT VISIT HI MDM: CPT

## 2023-10-30 PROCEDURE — 93010 ELECTROCARDIOGRAM REPORT: CPT

## 2023-10-30 RX ORDER — ACETAMINOPHEN 500 MG
650 TABLET ORAL ONCE
Refills: 0 | Status: COMPLETED | OUTPATIENT
Start: 2023-10-30 | End: 2023-10-30

## 2023-10-30 RX ORDER — IBUPROFEN 200 MG
400 TABLET ORAL ONCE
Refills: 0 | Status: DISCONTINUED | OUTPATIENT
Start: 2023-10-30 | End: 2023-10-30

## 2023-10-30 RX ORDER — HYDROXYZINE HCL 10 MG
25 TABLET ORAL ONCE
Refills: 0 | Status: COMPLETED | OUTPATIENT
Start: 2023-10-30 | End: 2023-10-30

## 2023-10-30 RX ORDER — HYDROXYZINE HCL 10 MG
25 TABLET ORAL EVERY 8 HOURS
Refills: 0 | Status: DISCONTINUED | OUTPATIENT
Start: 2023-10-30 | End: 2023-11-03

## 2023-10-30 RX ADMIN — Medication 25 MILLIGRAM(S): at 16:36

## 2023-10-30 RX ADMIN — Medication 650 MILLIGRAM(S): at 14:58

## 2023-10-30 NOTE — ED ADULT NURSE NOTE - OBJECTIVE STATEMENT
received pt in assigned area a&xo3 pt admits she has been having SI thoughts & posted on Facebook she wanted to kill herself & also texted a friend saying the same statement and that she attempted to harm self, pt now reports she did not hurt herself in anyway " just said it",no signs of trauma noted on pt. skin intact, resps even and non labored,  pt reports she has been feeling down about fixing her teeth & dealing with the constant tooth pain & issues with her boyfriend, pt denies any H.I. or auditory-visual hallucinations, pt admits she took 5 tablets of 200mg Ibuprofen in the am, denies any other drug ingestion. Pt undressed, placed in hospital gown, belongings taken from pt & all personal items checked by Security, CNA placed with pt on 1:1, safety maintained at all times.

## 2023-10-30 NOTE — ED BEHAVIORAL HEALTH ASSESSMENT NOTE - HPI (INCLUDE ILLNESS QUALITY, SEVERITY, DURATION, TIMING, CONTEXT, MODIFYING FACTORS, ASSOCIATED SIGNS AND SYMPTOMS)
Ms. Nicole is a 49 yo  female domiciled with boyfriend in private residence, , has two adult children, working as a  at an insurance company, PMHx of [facility] sp loop recorder, substance use history of smoking 2 packs of cigarettes daily, PPHx of charted anxiety and depression, suicide attempt via overdose at age 13, Ms. Nicole is a 49 yo  female domiciled with boyfriend in private residence, , has two adult children, working as a  at an insurance company, PMHx of [facility] sp loop recorder, substance use history of smoking 2 packs of cigarettes daily, PPHx of charted anxiety and depression, suicide attempt via overdose at age 13, no prior inpatient hospitalizations, not in outpatient treatment was BIBA when her workplace alerted EMS because of suicidal statements Ms. Nicole had made via facebook and texted to workplace colleagues. Telepsychiatry was consulted to evaluate for suicidality.    The patient was evaluated by telepsychiatry, she was located in an ED room with a 1:1 by her side. She is partially edentulous, appears unkempt, has a dysphoric and constricted affect and breaks out in tears at multiple times during the conversation. Ms. Nicole states that her workplace alerted EMS after she posted on facebook that she wanted to die, and texted 3-4 friends and workplace colleagues that she was thinking of suicide yesterday.  She states that her anxiety and mood have been worsening ever since she fell in a syncopal episode one month ago and lost her teeth. Ms. Nicole states that she cannot eat or sleep, but she attributes this to the severe oral pain she has. She names a litany of psychosocial stressors including feelings of financial insecurity ("I'm living paycheck to paycheck"), worries about her boyfriend "kicking me out" of his apartment, fears about her car, and fears of getting fired from her workplace. It is difficult for her to substantiate many of her fears when asked to give concrete evidence for why she believes that they will happen. At times she makes desperate statements such as "I'm going to be naked with infected teeth on the street!" She voices feelings of hopelessness that accompany her desperate feelings, and appears unable/unwilling to safety plan at this time. When asked about her suicidal statements she states that she feels like she has no other option but to die, however at the same time she states that she believes she will go to hell if she dies by suicide and that she is afraid of pain. She state that she has been thinking of hanging herself or overdosing on medications, but denies having worked out the details of this plan, or starting to undertake any actions to complete these plans. She does state that yesterday she took a total of 10 over the counter ibuprofen pills (ibuprofen mgs per tablet uknown), but states that this was an attempt to control her pain and not to kill herself. She does however admit that she usually takes far less ibuprofen to control her pain (200 mg at a time). Of note, she states that she had a suicide attempt at age 13 via overdose on antibiotics that she was taking for her acne. She states that at the time she was distressed and depressed because of being bullied for her acne. She was not brought to the hospital and did not see a psychiatrist after this attempt.     Patient denies symptoms consistent with acutely decompensated , tanai, or psychosis at this time. Patient also denies recent use of marijuana or illicit substances.     Collateral was obtained from patient's father, Slava Spence at [phone]  States that Ayse has always been a very nervous and obsessive person since she was young. States that she is prone to catastrophizing situations, and his nickname for her is the "mistress of what ifs". Per father, Ayse has been making suicidal statements since age 10, but he does not believe that she would ever actually act on these statements. He endorses that Ayse has been very upset since her accident and the loss of her teeth about a month ago. She's been perseverating about a number of problems including being able to afford repair of her teeth, having a car available, having a place to live, and affording her phone service. The family (father and brother) have reassured her multiple times that they would provide financial support and that she could live with her parents, however she continues to perseverate on these issues. Ms. Nicole is a 47 yo  female domiciled with boyfriend in private residence, , has two adult children, working as a  at an insurance company, PMHx of [facility] sp loop recorder, substance use history of smoking 2 packs of cigarettes daily, PPHx of charted anxiety and depression, suicide attempt via overdose at age 13, no prior inpatient hospitalizations, not in outpatient treatment was BIBA when her workplace alerted EMS because of suicidal statements Ms. Nicole had made via facebook and texted to workplace colleagues. Telepsychiatry was consulted to evaluate for suicidality.    The patient was evaluated by telepsychiatry, she was located in an ED room with a 1:1 by her side. She is partially edentulous, appears unkempt, has a dysphoric and constricted affect and breaks out in tears at multiple times during the conversation. Ms. Nicole states that her workplace alerted EMS after she posted on facebook that she wanted to die, and texted 3-4 friends and workplace colleagues that she was thinking of suicide yesterday.  She states that her anxiety and mood have been worsening ever since she fell in a syncopal episode one month ago and lost her teeth. Ms. Nicole states that she cannot eat or sleep, but she attributes this to the severe oral pain she has. She names a litany of psychosocial stressors including feelings of financial insecurity ("I'm living paycheck to paycheck"), worries about her boyfriend "kicking me out" of his apartment, fears about her car, and fears of getting fired from her workplace. It is difficult for her to substantiate many of her fears when asked to give concrete evidence for why she believes that they will happen. At times she makes desperate statements such as "I'm going to be naked with infected teeth on the street!" She voices feelings of hopelessness that accompany her desperate feelings, and appears unable/unwilling to safety plan at this time. When asked about her suicidal statements she states that she feels like she has no other option but to die, however at the same time she states that she believes she will go to hell if she dies by suicide and that she is afraid of pain. She state that she has been thinking of hanging herself or overdosing on medications, but denies having worked out the details of this plan, or starting to undertake any actions to complete these plans. She does state that yesterday she took a total of 10 over the counter ibuprofen pills (ibuprofen mgs per tablet uknown), but states that this was an attempt to control her pain and not to kill herself. She does however admit that she usually takes far less ibuprofen to control her pain (200 mg at a time). Of note, she states that she had a suicide attempt at age 13 via overdose on antibiotics that she was taking for her acne. She states that at the time she was distressed and depressed because of being bullied for her acne. She was not brought to the hospital and did not see a psychiatrist after this attempt.     Patient denies symptoms consistent with acutely decompensated , tania, or psychosis at this time. Patient also denies recent use of marijuana or illicit substances.     Collateral was obtained from patient's father, Slava Spence at [phone]  States that Ayse has always been a very nervous and obsessive person since she was young. States that she is prone to catastrophizing situations, and his nickname for her is the "mistress of what ifs". Per father, Ayse has been making suicidal statements since age 10, but he does not believe that she would ever actually act on these statements. He endorses that Ayse has been very upset since her accident and the loss of her teeth about a month ago. She's been perseverating about a number of problems including being able to afford repair of her teeth, having a car available, having a place to live, and affording her phone service. The family (father and brother) have reassured her multiple times that they would provide financial support and that she could live with her parents, however she continues to perseverate on these issues.    I STOP reviewed  Reference #: [id number]      A	N	O	09/22/2023	09/22/2023	oxycodone hcl (ir) 5 mg tablet	18	3	Josey Lugo	[id number]	[facility]	09/22/2023	09/22/2023	alprazolam 0.25 mg tablet	9	3	Josey Lugo	[id number]	[facility]	N	O	10/25/2023	10/26/2023	oxycodone hcl (ir) 5 mg tablet	5	1	[facility] Cristina	[id number]	Insurance	[facility] #[id number]  B	N	B	09/29/2023	09/29/2023	alprazolam 0.25 mg tablet	9	3	Rommel Kelly	[id number]	Insurance	[facility] #[id number]

## 2023-10-30 NOTE — ED BEHAVIORAL HEALTH ASSESSMENT NOTE - DESCRIPTION
ITZEL sp loop recorder placement Vitals taken  Labs taken  Telepsychiatry Domiciled with boyfriend in private residence which is her boyfriend's father's house (boyfriend is allegedly currently on probation for DUI).  from ex . Has two adult children, a 23 yo daughter who is not on speaking terms with the patient and a 21 yo son who has severe autism and lives in a residential center. She is currently working as a  at an insurance company

## 2023-10-30 NOTE — ED BEHAVIORAL HEALTH ASSESSMENT NOTE - OTHER PAST PSYCHIATRIC HISTORY (INCLUDE DETAILS REGARDING ONSET, COURSE OF ILLNESS, INPATIENT/OUTPATIENT TREATMENT)
PPHx of charted anxiety and depression, suicide attempt via overdose at age 13, no prior inpatient hospitalizations, not in outpatient treatment

## 2023-10-30 NOTE — ED PROVIDER NOTE - ENMT, MLM
Airway patent, Nasal mucosa clear. Mouth with normal mucosa. Throat has no vesicles, no oropharyngeal exudates and uvula is midline. multiple missing teeth

## 2023-10-30 NOTE — ED BEHAVIORAL HEALTH ASSESSMENT NOTE - SUMMARY
Ms. Nicole is a 47 yo  female with a  PMHx of HOCM sp loop recorder, substance use history of smoking 2 packs of cigarettes daily, PPHx of charted anxiety and depression, suicide attempt via overdose at age 13, no prior inpatient hospitalizations, not in outpatient treatment was BIBA when her workplace alerted EMS because of suicidal statements Ms. Nicole had made via facebook and texted to workplace colleagues. Telepsychiatry was consulted to evaluate for suicidality.    Upon initial assessment Ms. Nicole endorses feelings of hopelessness, anxiety and increased suicidal ideation triggered by an accident causing her to lose her teeth one month ago. She makes ambiguous remarks describing the level of her suicidal intent, and she is unable to safety plan at this time. Collateral obtained from her father asserts that the patient often makes suicidal statements, but that she does not act on them (he appeared unaware of patient's reported suicide attempt at age 13).     #Plan  - Will reassess patient to further assess acute suicide risk  - May offer PO Atarax 25 mg TID as PRN for anxiety    #Agitation  - For episodes of acute agitation can offer PO Haldol 5 mg/Ativan 2 mg/Benadryl 50 mg Q6H PRN. If refusing PO and is in acute risk of harm to self/other, can offer IM Haldol 5 mg/Ativan 2 mg/Benadryl 50 mg Q6H PRN. If given, please repeat EKG and hold antipsychotics if QTC>500 Ms. Nicole is a 49 yo  female with a  PMHx of HOCM sp loop recorder, substance use history of smoking 2 packs of cigarettes daily, PPHx of charted anxiety and depression, suicide attempt via overdose at age 13, no prior inpatient hospitalizations, not in outpatient treatment was BIBA when her workplace alerted EMS because of suicidal statements Ms. Nicole had made via [place] and texted to workplace colleagues. Telepsychiatry was consulted to evaluate for suicidality.    Upon initial assessment Ms. Nicole endorses feelings of hopelessness, anxiety and increased suicidal ideation triggered by an accident causing her to lose her teeth one month ago. She makes ambiguous remarks describing the level of her suicidal intent, and she is unable to safety plan at this time. Collateral obtained from her father asserts that the patient often makes suicidal statements, but that she does not act on them (he appeared unaware of patient's reported suicide attempt at age 13).     #Plan  - Will reassess patient to further assess acute suicide risk  - May offer PO Atarax 25 mg TID as PRN for anxiety Ms. Nicole is a 49 yo  female with a  PMHx of HOCM sp loop recorder, substance use history of smoking 2 packs of cigarettes daily, PPHx of charted anxiety and depression, suicide attempt via overdose at age 13, no prior inpatient hospitalizations, not in outpatient treatment was BIBA when her workplace alerted EMS because of suicidal statements Ms. Nicole had made via [employer] and texted to workplace colleagues. Telepsychiatry was consulted to evaluate for suicidality.    Upon initial assessment Ms. Nicole endorses feelings of hopelessness, anxiety and increased suicidal ideation triggered by an accident causing her to lose her teeth one month ago. She makes ambiguous remarks describing the level of her suicidal intent, and she is unable to safety plan at this time. Collateral obtained from her father asserts that the patient often makes suicidal statements, but that she does not act on them (he appeared unaware of patient's reported suicide attempt at age 13).     #Plan  - Will reassess patient to further assess acute suicide risk  - May offer PO Atarax 25 mg TID as PRN for anxiety  -recommend consider head CT  -if agitated may offer ativan 2mg considering HOCM, if EKG wnl may give haldol, please use conservative dosing (start with 2.5mg)

## 2023-10-30 NOTE — ED BEHAVIORAL HEALTH ASSESSMENT NOTE - PAST PSYCHOTROPIC MEDICATION
Was taking Lybalvi for two months this year, prescribed by her primary care physician for "bipolar disorder" (patient denies any symptoms consistent with a past manic episode), self discontinued 2 months ago because she could not afford it

## 2023-10-30 NOTE — ED BEHAVIORAL HEALTH ASSESSMENT NOTE - NSBHATTESTCOMMENTATTENDFT_PSY_A_CORE
Ms. Nicole is a 49 yo  female with a  PMHx of HOCM sp loop recorder, substance use history of smoking 2 packs of cigarettes daily, PPHx of charted anxiety and depression, suicide attempt via overdose at age 13, no prior inpatient hospitalizations, not in outpatient treatment was BIBA when her workplace alerted EMS because of suicidal statements Ms. Nicole had made via [employer] and texted to workplace colleagues. Telepsychiatry was consulted to evaluate for suicidality. During assessment, pt reported feelings of hopelessness, anxiety and increased suicidal ideation triggered by an accident causing her to lose her teeth one month ago (in the context of syncope/fall). She makes ambiguous remarks describing the level of her suicidal intent, and she is unable to safety plan at this time. She presented with limited ability to participate fully in assessment, secondary to interrupting in a somewhat childlike way, and was sitting facing 1:1 with her hospital gown around her thighs and seemingly not noticing or attending to this. Concern for possible cognitive component.

## 2023-10-30 NOTE — ED BEHAVIORAL HEALTH ASSESSMENT NOTE - RISK ASSESSMENT
The patient's chronic risk factors for suicide include trauma history (alleges divorce by ex ), history of suicide attempt, and family history of suicide in cousin. The patient's acute risk factors include currently being unable to safety plan, heightened level of distress and anxiety, being in acute pain, financial insecurity, and not being in outpatient psychiatric/psychotherapeutic care.

## 2023-10-30 NOTE — ED BEHAVIORAL HEALTH ASSESSMENT NOTE - NS ED BHA MED ROS PSYCHIATRIC
[facility] Integrated [facility]   November 27, 2017      Behavioral Health Clinician Progress Note    Patient Name: Nena Tang           Service Type: Individual      Service Location:   Face to Face in Clinic     Session Start Time: 1:39pm  Session End Time: 2:17pm      Session Length: 38 - 52      Attendees: Client, PCP and caregiver    Visit Activities (Refresh list every visit): [facility] [employer]    Diagnostic Assessment Date: Not completed  Treatment Plan Review Date: Not completed  See Flowsheets for today's PHQ-9 and TAMIA-7 results  Previous PHQ-9:   PHQ-9 SCORE 12/20/2016 1/2/2017 2/3/2017   Total Score - - -   Total Score - - -   Total Score 7 0 0     Previous TAMIA-7:   TAMIA-7 SCORE 12/20/2016 1/2/2017 2/3/2017   Total Score - - -   Total Score 6 0 0   Total Score - - -       ALEXANDRA LEVEL:  ALEXANDRA Score (Last Two) 8/30/2011 6/9/2014   ALEXANDRA Raw Score 42 37   Activation Score 66 49.9   ALEXANDRA Level 3 2       DATA  Extended Session (60+ minutes): No  Interactive Complexity: No  Crisis: No    Treatment Objective(s) Addressed in This Session:  Target Behavior(s): disease management/lifestyle changes Mental Health    Depressed Mood: Increase interest, engagement, and pleasure in doing things  Decrease frequency and intensity of feeling down, depressed, hopeless  Improve quantity and quality of night time sleep / decrease daytime naps  Improve diet, appetite, mindful eating, and / or meal planning  Identify negative self-talk and behaviors: challenge core beliefs, myths, and actions  Improve concentration, focus, and mindfulness in daily activities   Feel less fidgety, restless or slow in daily activities / interpersonal interactions  Decrease thoughts that you'd be better off dead or of suicide / self-harm  Anxiety: will experience a reduction in anxiety, will develop more effective coping skills to manage anxiety symptoms, will develop healthy cognitive patterns and beliefs and will increase ability to function  "adaptively  Mood Instability: will develop better understanding of triggers and coping strategies to stabilize mood  Risk / Safety: will develop strategies for more effective management of risk issues and will follow safety plan (in EMR) for more effective management of risk issues  Grief / Loss: will increase understanding of normal grieving process, will engage in effective approach to address and resolve grief/loss issues and will process grief/loss issues in an adaptive manner  Thought Disturbance: will develop skills to more effectively manage symptoms, will develop more effective support systems and will continue to take medications as prescribed    Current Stressors / Issues:    The [facility] intern met with the patient at the PCP's request. Nena said she has been experiencing dizziness and blurry vision since the last visit. She also said she has jittery hands, shakes a lot, and drop things when her blood sugars are high. She said she is tired of having high blood sugar levels and noted that her mood is worse when her blood sugars are high. She noted eating sometimes eating candy after her dinner at her residence and agreed to only eat one serving of food during dinner in an attempt to lower her blood sugar levels. We discussed Nena's recent ED admission for suicidal behaviors and she felt better while she was at the hospital-when she was in the hospital she reported not having hallucinations of a man in her room.   This writer asked if she is having thoughts to harm herself. She said she is having thoughts and answered \"yes\" when asked if she would like to go to the ED now. She later explained that she does not want to go to the ER and this writer let her know that she has the right to go to the ED whenever she wants. Nena said she will reach out to Zheng and his wife, her adult foster care takers if she wants to go to the ED. Nena said she will not tell people what her plan is but will reach out to " "people if she has intent to suicide. Nena said she is isolating herself from others in the adult foster care center but noted that she was \"cheerful\" when playing the game sorry with others in her residence yesterday. Nena also noted that she goes to a drop-in center five times a week and is social with others. She plays card games, pool, and participates in educational sessions, like nutrition education.     Progress on Treatment Objective(s) / Homework:  Worsening - PREPARATION (Decided to change - considering how); Intervened by negotiating a change plan and determining options / strategies for behavior change, identifying triggers, exploring social supports, and working towards setting a date to begin behavior change    Motivational Interviewing    MI Intervention: Expressed Empathy/Understanding, Supported Autonomy, Collaboration, Evocation, Permission to raise concern or advise, Open-ended questions and Reflections: simple and complex     Change Talk Expressed by the Patient: Desire to change Reasons to change Need to change Committment to change Activation Taking steps    Provider Response to Change Talk: E - Evoked more info from patient about behavior change, A - Affirmed patient's thoughts, decisions, or attempts at behavior change, R - Reflected patient's change talk and S - Summarized patient's change talk statements    Also provided psychoeducation about behavioral health condition, symptoms, and treatment options    Care Plan review completed: No    Medication Review:  See medication list    Medication Compliance:  NA    Changes in Health Issues:   None reported    Chemical Use Review:   Substance Use: Chemical use reviewed, no active concerns identified      Tobacco Use: No current tobacco use.      Assessment: Current Emotional / Mental Status (status of significant symptoms):  Risk status (Self / Other harm or suicidal ideation)  Patient has had a history of suicidal ideation: yes in the " past  Patient denies current fears or concerns for personal safety.  Patient reports the following current or recent suicidal ideation or behaviors: sucidal thoughts with plan to stay safe.  Patient denies current or recent homicidal ideation or behaviors.  Patient reports current or recent self injurious behavior or ideation including no current thoughts and she was able to contract for safety.  Patient denies other safety concerns.  A safety and risk management plan has not been developed at this time, however patient was encouraged to call [place] [phone] should there be a change in any of these risk factors.    Appearance:   Appropriate   Eye Contact:   Good   Psychomotor Behavior: Normal   Attitude:   Cooperative   Orientation:   All  Speech   Rate / Production: Normal    Volume:  Normal   Mood:    Normal  Affect:    Blunted   Thought Content:  Clear   Thought Form:  Coherent  Logical   Insight:    Fair     Diagnoses:  1. Schizoaffective disorder, depressive type (H)    2. Moderate major depression (H)        Collateral Reports Completed:  Not Applicable    Plan: (Homework, other):  Patient was given information about behavioral services and encouraged to schedule a follow up appointment with the clinic [facility] as needed.  She was also given information about mental health symptoms and treatment options .  CD Recommendations: Maintain Sobriety.     This note was written by Dennis Diana, [facility] intern and reviewed by Richardson Lopez, [facility], [facility] February 3, 2017   See HPI

## 2023-10-30 NOTE — ED PROVIDER NOTE - PROGRESS NOTE DETAILS
spoke to pts manager maria del rosario [phone] who advised pt sent text messages to a coworker stating "moni got to kill myself" and that she took pills last pm. consulted telepsych pending call back dr castellon telepsych evaluated pt. advised hold pt in ed for bed placement. give atarax tid as needed. will reeval hali [facility] has a bed for the patient at [facility].  Requesting review of her cardiology status prior to excepting for transfer.  EKG from yesterday is unchanged from EKG from September when patient had a syncopal episode and was admitted for further evaluation.  Patient noted to have history of hypertrophic cardiomyopathy and loop recorder insertion to monitor for further syncopal episode/arrhythmia.  No syncopal episode or arrhythmia noted during this ER visit.  Please see all notes from patient's cardiologist and electrophysiologist dated September 22 and sunrise. Patient is medically cleared for transfer to psychiatric facility and admission. Patient has a loop recorder inserted last month which requires no intervention at this time.  Patient does not have an AICD.  Patient has a history of hypertrophic cardiomyopathy which is being treated with Toprol XL 25 mg once a day.  There is no history of heart failure.  Her cardiologist is Dr. Nasim Johnson DO in [facility]. His phone number is [phone]. Social work informed me that patient has not been accepted to [facility] for transfer due to hospitalist and they are demanding cardiology clearance.  I informed them there is no cardiologist in-house at this time and patient would need to wait until tomorrow for cardiology evaluation at [facility]. Patient was cleared by cardiology for psych admission.  Awaiting [facility] to accept patient.  Telepsychiatrist Dr. Plummer requested Ativan 1 mg p.o. for patient now.

## 2023-10-30 NOTE — ED PROVIDER NOTE - OBJECTIVE STATEMENT
pt is a 49 yo female with pmhx of HCOM with loop recorder presents with suicidal  ideations. pt reports she had thoughts of hurting herself and expressed them to her coworkers, on facebook and to her friends. pt reports she told a friend she attempted suicide by taking ibuprofen but denies. pt reports this all transpired after she had syncopal episode causing her to loose multiple teeth. pt denies fever, cp, sob, homicidal ideations, delusions previous hospitalizations.

## 2023-10-30 NOTE — ED BEHAVIORAL HEALTH NOTE - BEHAVIORAL HEALTH NOTE
==================       Name: Corey Tavera    SOURCE: Tip Pereyra    DETAILS: BIBEMS presenting to be disorganized.      ============       ED COURSE       ============       SOURCE: Tip Pereyra    ARRIVAL: BIBEMS calm and cooperative with triage staff.    BELONGINGS: The patient is in a gown with constant observation.      BEHAVIOR:    The patient came to the ED after reporting SI. The patient has been restless but the RN reported the patient to be compliant with the ED assessments and cooperative with the ED staff now. The patient reportedly appears to have fair hygiene. On the physical observation, there were no visible marks/scars on the patient's body noted.      TREATMENT:    None      VISITORS:    None      -----------------------------------------------      COVID Exposure Screen- collateral (i.e. third-party, chart review, belongings, etc; include EMS and ED staff)      ---------------------------------------------------      1. Has the patient had a COVID-19 test in the last 90 days? Unknown.      2. Has the patient tested positive for COVID-19 antibodies? Unknown.      3.Has the patient received 2 doses of the COVID-19 vaccine?  Unknown.      4. In the past 10 days, has the patient been around anyone with a positive COVID-19 test?* Unknown.

## 2023-10-30 NOTE — ED BEHAVIORAL HEALTH ASSESSMENT NOTE - OTHER
boyfriend workplace At times collapses onto her self, sobbing, crying out Partially edentulous, appears dishevelled oddly related

## 2023-10-30 NOTE — ED BEHAVIORAL HEALTH ASSESSMENT NOTE - NSACTIVEVENT_PSY_ALL_CORE
Triggering events leading to humiliation, shame, and/or despair (e.g., Loss of relationship, financial or health status) (real or anticipated)/Current or pending social isolation/Chronic pain or other acute medical condition
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
## 2023-10-30 NOTE — ED BEHAVIORAL HEALTH ASSESSMENT NOTE - NSBHDSMAXES_PSY_ALL_CORE
Requested Prescriptions     Pending Prescriptions Disp Refills    metFORMIN ER (GLUCOPHAGE XR) 500 mg tablet 90 Tab 0     Sig: One tablet in the morning with breakfast and two tablets with dinner.
See above

## 2023-10-31 RX ORDER — ACETAMINOPHEN 500 MG
650 TABLET ORAL ONCE
Refills: 0 | Status: COMPLETED | OUTPATIENT
Start: 2023-10-31 | End: 2023-10-31

## 2023-10-31 RX ORDER — ACETAMINOPHEN 500 MG
1000 TABLET ORAL ONCE
Refills: 0 | Status: COMPLETED | OUTPATIENT
Start: 2023-10-31 | End: 2023-10-31

## 2023-10-31 RX ORDER — IBUPROFEN 200 MG
600 TABLET ORAL ONCE
Refills: 0 | Status: COMPLETED | OUTPATIENT
Start: 2023-10-31 | End: 2023-10-31

## 2023-10-31 RX ORDER — ONDANSETRON 8 MG/1
4 TABLET, FILM COATED ORAL ONCE
Refills: 0 | Status: COMPLETED | OUTPATIENT
Start: 2023-10-31 | End: 2023-10-31

## 2023-10-31 RX ADMIN — Medication 1000 MILLIGRAM(S): at 01:50

## 2023-10-31 RX ADMIN — Medication 600 MILLIGRAM(S): at 10:36

## 2023-10-31 RX ADMIN — Medication 25 MILLIGRAM(S): at 22:40

## 2023-10-31 RX ADMIN — Medication 400 MILLIGRAM(S): at 01:21

## 2023-10-31 RX ADMIN — Medication 25 MILLIGRAM(S): at 01:57

## 2023-10-31 RX ADMIN — Medication 600 MILLIGRAM(S): at 10:58

## 2023-10-31 RX ADMIN — ONDANSETRON 4 MILLIGRAM(S): 8 TABLET, FILM COATED ORAL at 01:21

## 2023-10-31 RX ADMIN — Medication 2 MILLIGRAM(S): at 15:28

## 2023-10-31 RX ADMIN — Medication 650 MILLIGRAM(S): at 15:00

## 2023-10-31 RX ADMIN — Medication 1000 MILLIGRAM(S): at 01:35

## 2023-10-31 RX ADMIN — Medication 25 MILLIGRAM(S): at 10:36

## 2023-10-31 RX ADMIN — Medication 650 MILLIGRAM(S): at 14:16

## 2023-10-31 NOTE — ED BEHAVIORAL HEALTH NOTE - BEHAVIORAL HEALTH NOTE
=========  REASSESSMENT  =========	  SOURCE:  RN Marj and secondhand ED documentation.  BEHAVIOR: RN described patient to be tearful, anxious, perseverative, c/o of teeth pain, reporting 10/10 pain. Medications offered and pt accepted without issue. No episodes of agitation or aggression. No other acute issues.   TREATMENT:  Per chart and RN, patient PRN medications: IV Acetaminophen 1000mg for pain, IV Zofran 4mg for nausea, and PO Atarax 25mg for anxiety.  VISITORS:  Per RN, no visitors at bedside.

## 2023-10-31 NOTE — SOCIAL WORK PROGRESS NOTE - NSSWPROGRESSNOTE_GEN_ALL_CORE
notified by ED TALHA Andrade of need for out-of-system inpatient adult psychiatry bed;  faxed referral to [facility] @ f [phone] and [facility] @ f [phone] -- will await response regarding acceptance & bed availability.  also notified by [facility] Telepsychiatry liaison Natty of request for cardiology consult as patient is being considered for inpatient adult psychiatry placement @ [facility]; ED MD Mcqueen aware. Will continue to follow.  notified by ED TALHA Andrade of need for out-of-system inpatient adult psychiatry bed;  faxed referral to [facility] @ f [phone] and [facility] @ f [phone] -- will await response regarding acceptance & bed availability, & will continue outreach to other facilities.  also notified by [facility] Telepsychiatry liaison Natty of request for cardiology consult as patient is being considered for inpatient adult psychiatry placement @ [facility]; ED MD Mcqueen aware. Will continue to follow.

## 2023-11-01 VITALS
HEART RATE: 82 BPM | OXYGEN SATURATION: 99 % | SYSTOLIC BLOOD PRESSURE: 110 MMHG | TEMPERATURE: 98 F | RESPIRATION RATE: 15 BRPM | DIASTOLIC BLOOD PRESSURE: 78 MMHG

## 2023-11-01 PROCEDURE — 80307 DRUG TEST PRSMV CHEM ANLYZR: CPT

## 2023-11-01 PROCEDURE — 96375 TX/PRO/DX INJ NEW DRUG ADDON: CPT

## 2023-11-01 PROCEDURE — 93005 ELECTROCARDIOGRAM TRACING: CPT

## 2023-11-01 PROCEDURE — 99215 OFFICE O/P EST HI 40 MIN: CPT | Mod: 95

## 2023-11-01 PROCEDURE — 84702 CHORIONIC GONADOTROPIN TEST: CPT

## 2023-11-01 PROCEDURE — 99285 EMERGENCY DEPT VISIT HI MDM: CPT | Mod: 25

## 2023-11-01 PROCEDURE — 81003 URINALYSIS AUTO W/O SCOPE: CPT

## 2023-11-01 PROCEDURE — 85025 COMPLETE CBC W/AUTO DIFF WBC: CPT

## 2023-11-01 PROCEDURE — 87635 SARS-COV-2 COVID-19 AMP PRB: CPT

## 2023-11-01 PROCEDURE — 80053 COMPREHEN METABOLIC PANEL: CPT

## 2023-11-01 PROCEDURE — 96374 THER/PROPH/DIAG INJ IV PUSH: CPT

## 2023-11-01 PROCEDURE — 36415 COLL VENOUS BLD VENIPUNCTURE: CPT

## 2023-11-01 RX ORDER — IBUPROFEN 200 MG
600 TABLET ORAL ONCE
Refills: 0 | Status: COMPLETED | OUTPATIENT
Start: 2023-11-01 | End: 2023-11-01

## 2023-11-01 RX ORDER — ACETAMINOPHEN 500 MG
650 TABLET ORAL ONCE
Refills: 0 | Status: COMPLETED | OUTPATIENT
Start: 2023-11-01 | End: 2023-11-01

## 2023-11-01 RX ADMIN — Medication 600 MILLIGRAM(S): at 11:40

## 2023-11-01 RX ADMIN — Medication 25 MILLIGRAM(S): at 11:14

## 2023-11-01 RX ADMIN — Medication 1 MILLIGRAM(S): at 11:39

## 2023-11-01 RX ADMIN — Medication 650 MILLIGRAM(S): at 03:39

## 2023-11-01 RX ADMIN — Medication 650 MILLIGRAM(S): at 04:30

## 2023-11-01 NOTE — ED BEHAVIORAL HEALTH NOTE - BEHAVIORAL HEALTH NOTE
=========  REASSESSMENT  =========    SOURCE:  RN Sayu and secondhand ED documentation.    BEHAVIOR: pt has been calm and cooperative overnight, currently asleep.      TREATMENT:  Per chart and RN, patient did not require PRN medications.    VISITORS:  Per RN, there are no visitors at bedside overnight.

## 2023-11-01 NOTE — ED ADULT NURSE REASSESSMENT NOTE - NS ED NURSE REASSESS COMMENT FT1
Pt is alert and oriented. Pt states that she is worried about getting a job. Pt states that she has pain in her mouth. Pt denies sob, chest pain, nausea, vomiting, and dizziness. Pt resp are even and unlabored, skin color can for race. Pt updated on plan of care. 1:1 in place. Belongings secured.
pt awake most of the night so far. c/o tooth pain and was medicated as ordered. pt reports her pain is constant and "nothing works". tearful at times; cries loudly then is quiet. pt was offered. PRN medication for anxiety and agreed to be medicated. maintained on 1:1 observation for patient safety. will continue to observe
pt cody secured and given to security
pt now resting and remains less agitated at this time, 1:1 remains in progress, safety maintained
pt voided. less bloody than first void. bladder scan done; results >170 to >290. pt instructed to stay well hydrated. taking and tolerating PO fluids. OOB to bathroom at this time
pt woke up crying and shouting c.o tooth pain, MD made aware, pending med orders, 1:1 remains in progress
received report and assumed care of pt at change of shift. pt maintained on 1:1 observation for pt safety. pt to be re-evaluated by tele psych at a later time. will continue to monitor
remains on 1:1 tele psych MD speaking with pt, will continue to monitor safety remains maintained
sleeping comfortably in stretcher, 1:1 remains in progress, safety maintained
vs as charted. pt remains on 1:1 observation for pt safety. she continues to be anxious and worry about her teeth, how she will get them fixed, etc. will continue to monitor
pt received at change of shift, pt resting in stretcher, bed in low position, call bell within reach, plan of care discussed, will monitor.  constant observation in progress.

## 2023-11-01 NOTE — ED BEHAVIORAL HEALTH PROGRESS NOTE - PSYCHIATRIC ISSUES AND PLAN (INCLUDE STANDING AND PRN MEDICATION)
-Admit on 9.27 status  -May offer PO Atarax 25 mg TID as PRN for anxiety  -if agitated may offer ativan 2mg considering HOCM, if EKG wnl may give haldol, please use conservative dosing (start with 2.5mg)

## 2023-11-01 NOTE — CONSULT NOTE ADULT - SUBJECTIVE AND OBJECTIVE BOX
History of Present Illness: The patient is a 48 year old female with a history of apical HCM, ILR who presents with suicidal ideations. The patient is unable to provide much history to me. She denies chest pain, shortness of breath, palpitations. She was recently diagnosed with apical HCM after an episode of syncope. ILR was placed. She followed up with her cardiologist and no events were found on ILR.    Past Medical/Surgical History:  apical HCM, ILR     Medications:  Metoprolol succinate 25 mg daily    Family History: Grandfather - MI    Social History: No tobacco, alcohol or drug use    Review of Systems:  General: No fevers, chills, weight gain  Skin: No rashes, color changes  Cardiovascular: No chest pain, orthopnea  Respiratory: No shortness of breath, cough  Gastrointestinal: No nausea, abdominal pain  Genitourinary: No incontinence, pain with urination  Musculoskeletal: No pain, swelling, decreased range of motion  Neurological: No headache, weakness  Psychiatric: No depression, anxiety  Endocrine: No weight gain, increased thirst  All other systems are comprehensively negative.    Physical Exam:  Vitals:        Vital Signs Last 24 Hrs  T(C): 36.3 (01 Nov 2023 07:40), Max: 36.8 (31 Oct 2023 17:09)  T(F): 97.4 (01 Nov 2023 07:40), Max: 98.3 (31 Oct 2023 17:09)  HR: 80 (01 Nov 2023 07:40) (78 - 86)  BP: 116/80 (01 Nov 2023 07:40) (101/66 - 119/82)  BP(mean): --  RR: 18 (01 Nov 2023 07:40) (16 - 18)  SpO2: 95% (01 Nov 2023 07:40) (95% - 97%)  Parameters below as of 31 Oct 2023 20:00  Patient On (Oxygen Delivery Method): room air  General: NAD  HEENT: MMM  Neck: No JVD, no carotid bruit  Lungs: CTAB  CV: RRR, nl S1/S2, no M/R/G  Abdomen: S/NT/ND, +BS  Extremities: No LE edema, no cyanosis  Neuro: AAOx3, non-focal  Skin: No rash    Labs:                        14.9   10.51 )-----------( 259      ( 30 Oct 2023 13:35 )             44.3     10-30    134<L>  |  98  |  12  ----------------------------<  126<H>  3.9   |  24  |  1.14    Ca    10.3      30 Oct 2023 13:08    TPro  7.4  /  Alb  4.2  /  TBili  0.6  /  DBili  x   /  AST  21  /  ALT  19  /  AlkPhos  46  10-30            ECG/Telemetry: NSR, LVH with repolarization abnormality

## 2023-11-01 NOTE — ED BEHAVIORAL HEALTH PROGRESS NOTE - RISK ASSESSMENT
risk factors: lack of connection to care; ongoing SI; hopelessness; pain  Protective factors: No past SA; no past NSSIB; no co-morbid substance use; domiciled status; future-orientation; lack of current suicidality or homicidally; lack of urges to self-harm or engage in NSSIB; identifies various reasons for living

## 2023-11-01 NOTE — ED BEHAVIORAL HEALTH PROGRESS NOTE - CASE SUMMARY/FORMULATION (CLEARLY DOCUMENT RATIONALE FOR DISPOSITION CHANGE)
47 yo  female with a PMHx of HOCM sp loop recorder, substance use history of smoking 2 packs of cigarettes daily, PPHx of charted anxiety and depression, suicide attempt via overdose at age 13, no prior inpatient hospitalizations, not in outpatient treatment was BIBA when her workplace alerted EMS because of suicidal statements Ms. Nicole had made via [employer] and texted to workplace colleagues. Telepsychiatry was consulted to evaluate for suicidality.    Upon initial assessment Ms. Nicole endorses feelings of hopelessness, anxiety and increased suicidal ideation triggered by an accident causing her to lose her teeth one month ago. She makes ambiguous remarks describing the level of her suicidal intent, and she is unable to safety plan at this time. Collateral obtained from her father asserts that the patient often makes suicidal statements, but that she does not act on them (he appeared unaware of patient's reported suicide attempt at age 13).     On reassessment, patient continues to perseverate on her stressors and having "no way out" other than suicide. She is unable to meaningfully safety plan again this morning and continues to meet criteria for involuntary psychiatric hospitalization.

## 2023-11-01 NOTE — ED BEHAVIORAL HEALTH PROGRESS NOTE - SUMMARY
The patient's chronic risk factors for suicide include trauma history (alleges divorce by ex ), history of suicide attempt, and family history of suicide in cousin. The patient's acute risk factors include currently being unable to safety plan, heightened level of distress and anxiety, being in acute pain, financial insecurity, and not being in outpatient psychiatric/psychotherapeutic care.  Low  Unable to determine Suicide Risk  Yes

## 2023-11-01 NOTE — ED BEHAVIORAL HEALTH PROGRESS NOTE - DETAILS:
Patient seen and examined on reassessment this morning. Chart reviewed. Case discussed with EM provider. Patient continues to endorse profound dysphoria and hopelessness about her situation. Cites pain in her mouth and inability to chew food as major stressor. She continues to endorse SI with unclear intent but plan to OD on pills. She feels she has "no way out of this situation". She is perseverating about potentially losing her job, having no money, and being unable to afford getting dental work. She reports no sleep overnight and minimal appetite. She is unable to meaningfully safety plan in the ED this morning.

## 2023-11-01 NOTE — ED BEHAVIORAL HEALTH PROGRESS NOTE - MEDICAL ISSUES AND PLAN (INCLUDE STANDING AND PRN MEDICATION)
as per EM; cardiology consulted as per [facility]'s request prior to accepting patient; patient medically cleared

## 2023-11-01 NOTE — CONSULT NOTE ADULT - ASSESSMENT
The patient is a 48 year old female with a history of apical HCM, ILR who presents with suicidal ideations.     Plan:  - ECG with LVH related abnormality  - Recent echo and cardiac MRI with possible apical HCM  - Refer to outpatient cardiology note 10/20/23 for further details. ILR was interrogated with no episodes of VT, only intermittent PVCs.  - Continue metoprolol succinate 25 mg daily  - No further cardiac testing indicated  - Patient needs further psychiatric management and is a suitable candidate for inpatient psychiatry

## 2023-11-20 ENCOUNTER — NON-APPOINTMENT (OUTPATIENT)
Age: 48
End: 2023-11-20

## 2023-11-20 ENCOUNTER — APPOINTMENT (OUTPATIENT)
Dept: ELECTROPHYSIOLOGY | Facility: CLINIC | Age: 48
End: 2023-11-20
Payer: COMMERCIAL

## 2023-11-20 PROCEDURE — G2066: CPT | Mod: NC

## 2023-11-20 PROCEDURE — 93298 REM INTERROG DEV EVAL SCRMS: CPT

## 2023-11-21 PROBLEM — Z86.79 PERSONAL HISTORY OF OTHER DISEASES OF THE CIRCULATORY SYSTEM: Chronic | Status: ACTIVE | Noted: 2023-10-30

## 2023-12-22 ENCOUNTER — NON-APPOINTMENT (OUTPATIENT)
Age: 48
End: 2023-12-22

## 2023-12-22 ENCOUNTER — APPOINTMENT (OUTPATIENT)
Dept: ELECTROPHYSIOLOGY | Facility: CLINIC | Age: 48
End: 2023-12-22
Payer: COMMERCIAL

## 2023-12-22 PROCEDURE — 93298 REM INTERROG DEV EVAL SCRMS: CPT

## 2023-12-22 PROCEDURE — G2066: CPT

## 2024-01-26 ENCOUNTER — NON-APPOINTMENT (OUTPATIENT)
Age: 49
End: 2024-01-26

## 2024-01-26 ENCOUNTER — APPOINTMENT (OUTPATIENT)
Dept: ELECTROPHYSIOLOGY | Facility: CLINIC | Age: 49
End: 2024-01-26
Payer: COMMERCIAL

## 2024-01-26 PROCEDURE — 93298 REM INTERROG DEV EVAL SCRMS: CPT

## 2024-02-24 NOTE — ED ADULT NURSE NOTE - CHIEF COMPLAINT QUOTE
BIBEMS c/o lip lesion to bottom lip.  pt states she had a fall on 9/20 and received sutures to her lips and lower lip.  pt followed up with her PCP who is concerned for basal cell carcinoma on her lip.  pt denies difficulty breathing, sob, trouble swallowing, bleeding, discharge. No

## 2024-02-28 ENCOUNTER — APPOINTMENT (OUTPATIENT)
Dept: ELECTROPHYSIOLOGY | Facility: CLINIC | Age: 49
End: 2024-02-28

## 2024-04-01 ENCOUNTER — NON-APPOINTMENT (OUTPATIENT)
Age: 49
End: 2024-04-01

## 2024-04-02 ENCOUNTER — APPOINTMENT (OUTPATIENT)
Dept: ELECTROPHYSIOLOGY | Facility: CLINIC | Age: 49
End: 2024-04-02
Payer: COMMERCIAL

## 2024-04-02 PROCEDURE — 93298 REM INTERROG DEV EVAL SCRMS: CPT

## 2024-04-16 ENCOUNTER — APPOINTMENT (OUTPATIENT)
Dept: CARDIOLOGY | Facility: CLINIC | Age: 49
End: 2024-04-16

## 2024-05-06 ENCOUNTER — NON-APPOINTMENT (OUTPATIENT)
Age: 49
End: 2024-05-06

## 2024-05-07 ENCOUNTER — APPOINTMENT (OUTPATIENT)
Dept: ELECTROPHYSIOLOGY | Facility: CLINIC | Age: 49
End: 2024-05-07
Payer: COMMERCIAL

## 2024-05-07 PROCEDURE — 93298 REM INTERROG DEV EVAL SCRMS: CPT

## 2024-06-10 ENCOUNTER — NON-APPOINTMENT (OUTPATIENT)
Age: 49
End: 2024-06-10

## 2024-06-11 ENCOUNTER — APPOINTMENT (OUTPATIENT)
Dept: ELECTROPHYSIOLOGY | Facility: CLINIC | Age: 49
End: 2024-06-11
Payer: COMMERCIAL

## 2024-06-11 PROCEDURE — 93298 REM INTERROG DEV EVAL SCRMS: CPT

## 2024-07-15 ENCOUNTER — NON-APPOINTMENT (OUTPATIENT)
Age: 49
End: 2024-07-15

## 2024-07-16 ENCOUNTER — APPOINTMENT (OUTPATIENT)
Dept: ELECTROPHYSIOLOGY | Facility: CLINIC | Age: 49
End: 2024-07-16
Payer: COMMERCIAL

## 2024-07-16 PROCEDURE — 93298 REM INTERROG DEV EVAL SCRMS: CPT

## 2024-08-08 NOTE — ED ADULT TRIAGE NOTE - HEART RATE (BEATS/MIN)
Cholesterol is going up.  He should restart atorvastatin.  New order sent.  Patient should also take vitamin D.  Order sent.  Continue diet lifestyle changes for diabetes
104

## 2024-08-18 ENCOUNTER — NON-APPOINTMENT (OUTPATIENT)
Age: 49
End: 2024-08-18

## 2024-08-19 ENCOUNTER — APPOINTMENT (OUTPATIENT)
Dept: ELECTROPHYSIOLOGY | Facility: CLINIC | Age: 49
End: 2024-08-19
Payer: COMMERCIAL

## 2024-08-19 PROCEDURE — 93298 REM INTERROG DEV EVAL SCRMS: CPT

## 2024-09-22 ENCOUNTER — NON-APPOINTMENT (OUTPATIENT)
Age: 49
End: 2024-09-22

## 2024-09-23 ENCOUNTER — APPOINTMENT (OUTPATIENT)
Dept: ELECTROPHYSIOLOGY | Facility: CLINIC | Age: 49
End: 2024-09-23
Payer: COMMERCIAL

## 2024-09-23 PROCEDURE — 93298 REM INTERROG DEV EVAL SCRMS: CPT

## 2024-10-28 ENCOUNTER — APPOINTMENT (OUTPATIENT)
Dept: ELECTROPHYSIOLOGY | Facility: CLINIC | Age: 49
End: 2024-10-28
Payer: COMMERCIAL

## 2024-10-28 ENCOUNTER — NON-APPOINTMENT (OUTPATIENT)
Age: 49
End: 2024-10-28

## 2024-10-28 PROCEDURE — 93298 REM INTERROG DEV EVAL SCRMS: CPT

## 2024-11-04 NOTE — ED PROVIDER NOTE - RESPIRATORY NEGATIVE STATEMENT, MLM
Pt presents to ED with c/o of hand and feet tingling. Pt was seen at  and told to come to ED for further eval. Pt had a thyroidectomy on Oct 29th.   IC concerned for low calcium, pt states she has been taking her calcium medication    no chest pain, no cough, and no shortness of breath.

## 2024-12-02 ENCOUNTER — NON-APPOINTMENT (OUTPATIENT)
Age: 49
End: 2024-12-02

## 2024-12-02 ENCOUNTER — APPOINTMENT (OUTPATIENT)
Dept: ELECTROPHYSIOLOGY | Facility: CLINIC | Age: 49
End: 2024-12-02
Payer: COMMERCIAL

## 2024-12-02 PROCEDURE — 93298 REM INTERROG DEV EVAL SCRMS: CPT

## 2025-01-06 ENCOUNTER — APPOINTMENT (OUTPATIENT)
Dept: ELECTROPHYSIOLOGY | Facility: CLINIC | Age: 50
End: 2025-01-06
Payer: COMMERCIAL

## 2025-01-06 ENCOUNTER — NON-APPOINTMENT (OUTPATIENT)
Age: 50
End: 2025-01-06

## 2025-01-06 PROCEDURE — 93298 REM INTERROG DEV EVAL SCRMS: CPT

## 2025-02-10 ENCOUNTER — APPOINTMENT (OUTPATIENT)
Dept: ELECTROPHYSIOLOGY | Facility: CLINIC | Age: 50
End: 2025-02-10
Payer: COMMERCIAL

## 2025-02-10 ENCOUNTER — NON-APPOINTMENT (OUTPATIENT)
Age: 50
End: 2025-02-10

## 2025-02-10 PROCEDURE — 93298 REM INTERROG DEV EVAL SCRMS: CPT

## 2025-03-17 ENCOUNTER — NON-APPOINTMENT (OUTPATIENT)
Age: 50
End: 2025-03-17

## 2025-03-17 ENCOUNTER — APPOINTMENT (OUTPATIENT)
Dept: ELECTROPHYSIOLOGY | Facility: CLINIC | Age: 50
End: 2025-03-17
Payer: COMMERCIAL

## 2025-03-17 PROCEDURE — 93298 REM INTERROG DEV EVAL SCRMS: CPT

## 2025-04-21 ENCOUNTER — NON-APPOINTMENT (OUTPATIENT)
Age: 50
End: 2025-04-21

## 2025-04-21 ENCOUNTER — APPOINTMENT (OUTPATIENT)
Dept: ELECTROPHYSIOLOGY | Facility: CLINIC | Age: 50
End: 2025-04-21
Payer: COMMERCIAL

## 2025-04-21 PROCEDURE — 93298 REM INTERROG DEV EVAL SCRMS: CPT

## 2025-04-25 NOTE — ED BEHAVIORAL HEALTH PROGRESS NOTE - SOURCES CURRRENT EVALUATION
ProceduresBOTOX PROCEDURE    PROCEDURE PERFORMED: Botulinum toxin injection (74549)    CLINICAL INDICATION: G43.719    A time out was conducted just before the start of the procedure to verify the correct patient and procedure, procedure location, and all relevant critical information.   The patient meets criteria for chronic headaches according to the ICHD-II, the patient has more than 15 headaches a month out of at least 8 are migraines which last for more than 4 hours a day.    The Botox injections have achieved well over 50%  improvement in the patient's symptoms. Migraines have been reduced at least 7 days per month and the number of cumulative hours suffering with headaches has been reduced at least 100 total hours per month. Today she does have a headache indicating that the Botox has worn off. Frequency of treatment is every 3 months unless no response to the treatments, at which time we will discontinue the injections.      DESCRIPTION OF PROCEDURE: After obtaining informed consent and under aseptic technique, a total of 175 units of botulinum toxin type A were injected in the following muscles: Procerus 5 units,  5 units bilaterally, frontalis 20 units, temporalis 20 units bilaterally, occipitalis 15 units bilaterally, upper cervical paraspinals 10 units bilaterally, masseters 10 units bilaterally and trapezius 15 units bilaterally. The patient was given a total of 175 units in 31 sites.The patient tolerated the procedure well. There were no complications. The patient was given a prescription for repeat treatment in 3 months.     Unavoidable waste 25 units      Hemanth Kowalski M.D  Medical Director, Headache and Facial Pain  [facility]  
Patient Interview

## 2025-05-22 ENCOUNTER — APPOINTMENT (OUTPATIENT)
Dept: ELECTROPHYSIOLOGY | Facility: CLINIC | Age: 50
End: 2025-05-22
Payer: COMMERCIAL

## 2025-05-22 ENCOUNTER — NON-APPOINTMENT (OUTPATIENT)
Age: 50
End: 2025-05-22

## 2025-05-22 PROCEDURE — 93298 REM INTERROG DEV EVAL SCRMS: CPT

## 2025-06-02 ENCOUNTER — NON-APPOINTMENT (OUTPATIENT)
Age: 50
End: 2025-06-02

## 2025-06-26 ENCOUNTER — APPOINTMENT (OUTPATIENT)
Dept: ELECTROPHYSIOLOGY | Facility: CLINIC | Age: 50
End: 2025-06-26
Payer: COMMERCIAL

## 2025-06-26 ENCOUNTER — NON-APPOINTMENT (OUTPATIENT)
Age: 50
End: 2025-06-26

## 2025-06-26 PROCEDURE — 93298 REM INTERROG DEV EVAL SCRMS: CPT

## 2025-07-31 ENCOUNTER — NON-APPOINTMENT (OUTPATIENT)
Age: 50
End: 2025-07-31

## 2025-07-31 ENCOUNTER — APPOINTMENT (OUTPATIENT)
Dept: ELECTROPHYSIOLOGY | Facility: CLINIC | Age: 50
End: 2025-07-31
Payer: COMMERCIAL

## 2025-07-31 PROCEDURE — 93298 REM INTERROG DEV EVAL SCRMS: CPT

## 2025-09-04 ENCOUNTER — APPOINTMENT (OUTPATIENT)
Dept: ELECTROPHYSIOLOGY | Facility: CLINIC | Age: 50
End: 2025-09-04